# Patient Record
Sex: FEMALE | Race: BLACK OR AFRICAN AMERICAN | NOT HISPANIC OR LATINO
[De-identification: names, ages, dates, MRNs, and addresses within clinical notes are randomized per-mention and may not be internally consistent; named-entity substitution may affect disease eponyms.]

---

## 2017-04-09 ENCOUNTER — RESULT REVIEW (OUTPATIENT)
Age: 52
End: 2017-04-09

## 2018-03-27 ENCOUNTER — APPOINTMENT (OUTPATIENT)
Dept: ULTRASOUND IMAGING | Facility: IMAGING CENTER | Age: 53
End: 2018-03-27
Payer: COMMERCIAL

## 2018-03-27 ENCOUNTER — OUTPATIENT (OUTPATIENT)
Dept: OUTPATIENT SERVICES | Facility: HOSPITAL | Age: 53
LOS: 1 days | End: 2018-03-27
Payer: COMMERCIAL

## 2018-03-27 DIAGNOSIS — Z00.8 ENCOUNTER FOR OTHER GENERAL EXAMINATION: ICD-10-CM

## 2018-03-27 PROCEDURE — 93971 EXTREMITY STUDY: CPT | Mod: 26,LT

## 2018-03-27 PROCEDURE — 93971 EXTREMITY STUDY: CPT

## 2018-05-30 ENCOUNTER — APPOINTMENT (OUTPATIENT)
Dept: NEUROLOGY | Facility: CLINIC | Age: 53
End: 2018-05-30

## 2019-02-01 ENCOUNTER — EMERGENCY (EMERGENCY)
Facility: HOSPITAL | Age: 54
LOS: 1 days | Discharge: HOME | End: 2019-02-01
Attending: EMERGENCY MEDICINE
Payer: COMMERCIAL

## 2019-02-01 VITALS
WEIGHT: 210.1 LBS | RESPIRATION RATE: 16 BRPM | OXYGEN SATURATION: 97 % | HEIGHT: 65 IN | DIASTOLIC BLOOD PRESSURE: 73 MMHG | HEART RATE: 79 BPM | SYSTOLIC BLOOD PRESSURE: 123 MMHG | TEMPERATURE: 98 F

## 2019-02-01 DIAGNOSIS — Z90.710 ACQUIRED ABSENCE OF BOTH CERVIX AND UTERUS: Chronic | ICD-10-CM

## 2019-02-01 PROCEDURE — 99282 EMERGENCY DEPT VISIT SF MDM: CPT

## 2019-02-01 PROCEDURE — 99283 EMERGENCY DEPT VISIT LOW MDM: CPT

## 2019-02-01 RX ORDER — ACETAMINOPHEN 500 MG
975 TABLET ORAL ONCE
Qty: 0 | Refills: 0 | Status: COMPLETED | OUTPATIENT
Start: 2019-02-01 | End: 2019-02-01

## 2019-02-01 RX ADMIN — Medication 975 MILLIGRAM(S): at 12:58

## 2019-02-01 RX ADMIN — Medication 975 MILLIGRAM(S): at 13:30
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
## 2019-02-01 NOTE — ED ADULT NURSE NOTE - NSIMPLEMENTINTERV_GEN_ALL_ED
Implemented All Fall with Harm Risk Interventions:  [place] to call system. Call bell, personal items and telephone within reach. Instruct patient to call for assistance. Room bathroom lighting operational. Non-slip footwear when patient is off stretcher. Physically safe environment: no spills, clutter or unnecessary equipment. Stretcher in lowest position, wheels locked, appropriate side rails in place. Provide visual cue, wrist band, yellow gown, etc. Monitor gait and stability. Monitor for mental status changes and reorient to person, place, and time. Review medications for side effects contributing to fall risk. Reinforce activity limits and safety measures with patient and family. Provide visual clues: red socks.

## 2019-02-01 NOTE — ED PROVIDER NOTE - NS ED ROS FT
Gen: No fever, normal appetite  Eyes: No eye irritation or discharge  ENT: No ear pain, congestion, sore throat  Resp: No cough or trouble breathing  Cardiovascular: No chest pain or palpitation  Gastroenteric: No nausea/vomiting, diarrhea, constipation  :  No change in urine output; no dysuria  MS: + back pain, neck pain   Skin: No rashes  Neuro: No headache; no abnormal movements  Remainder negative, except as per the HPI Gen: No fever, normal appetite  Eyes: No eye irritation or discharge  ENT: No ear pain, congestion, sore throat  Resp: No cough or trouble breathing  Cardiovascular: No chest pain or palpitation  Gastroenteric: No nausea/vomiting, diarrhea, constipation  :  No change in urine output; no dysuria  MS: + back pain, neck pain   Skin: No rashes  Neuro: +  mild  headache; no abnormal movements  Remainder negative, except as per the HPI

## 2019-02-01 NOTE — ED PROVIDER NOTE - PHYSICAL EXAMINATION
Gen: AAO3, NAD   Head: NCAT  ENT: Airway patent, moist mucous membranes  Cardiac: Normal rate, normal rhythm, no murmurs/rubs/gallops appreciated  Respiratory: Lungs CTA B/L  Gastrointestinal: Abdomen soft, nontender, nondistended, no rebound, no guarding  MSK: No gross abnormalities, FROM of all four extremities, no edema  HEME: Extremities warm, pulses intact and symmetrical in all four extremities  Skin: No rashes, no lesions  Neuro: No gross neurologic deficits Gen: AAO3, NAD   Head: NCAT  ENT: Airway patent, moist mucous membranes, TMs clear b/l   Cardiac: Normal rate, normal rhythm, no murmurs/rubs/gallops appreciated  Respiratory: Lungs CTA B/L  Gastrointestinal: Abdomen soft, nontender, nondistended, no rebound, no guarding  MSK: No gross abnormalities, FROM of all four extremities, no edema  HEME: Extremities warm, pulses intact and symmetrical in all four extremities  Skin: No rashes, no lesions  Neuro: No gross neurologic deficits, moves all extremities, strength equal in all four extremities, gait normal, CN II- XII intact Gen: AAO3, NAD   Head: NCAT  ENT: Airway patent, moist mucous membranes, TMs clear b/l , EOMI, no nystagmus   Cardiac: Normal rate, normal rhythm, no murmurs/rubs/gallops appreciated  Respiratory: Lungs CTA B/L  Gastrointestinal: Abdomen soft, nontender, nondistended, no rebound, no guarding  MSK: No gross abnormalities, FROM of all four extremities, no edema  HEME: Extremities warm, pulses intact and symmetrical in all four extremities  Skin: No rashes, no lesions, no ecchymosis   Neuro: No gross neurologic deficits, moves all extremities, no dysmetria, strength equal in all four extremities, gait normal, CN II- XII intact
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
## 2019-02-01 NOTE — ED PROVIDER NOTE - PROGRESS NOTE DETAILS
Yao PGY2: pain improved, pt ambulatory, moves without any restrictions due to pain, feels comfortable with outpt f/u, given return precautions.
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
## 2021-09-20 ENCOUNTER — EMERGENCY (EMERGENCY)
Facility: HOSPITAL | Age: 56
LOS: 1 days | Discharge: HOME | End: 2021-09-20
Attending: STUDENT IN AN ORGANIZED HEALTH CARE EDUCATION/TRAINING PROGRAM
Payer: COMMERCIAL

## 2021-09-20 VITALS
SYSTOLIC BLOOD PRESSURE: 152 MMHG | TEMPERATURE: 98 F | OXYGEN SATURATION: 100 % | RESPIRATION RATE: 17 BRPM | WEIGHT: 253.09 LBS | HEART RATE: 84 BPM | DIASTOLIC BLOOD PRESSURE: 68 MMHG | HEIGHT: 65 IN

## 2021-09-20 VITALS
TEMPERATURE: 98 F | OXYGEN SATURATION: 96 % | RESPIRATION RATE: 16 BRPM | SYSTOLIC BLOOD PRESSURE: 124 MMHG | HEART RATE: 83 BPM | DIASTOLIC BLOOD PRESSURE: 71 MMHG

## 2021-09-20 DIAGNOSIS — Z90.710 ACQUIRED ABSENCE OF BOTH CERVIX AND UTERUS: Chronic | ICD-10-CM

## 2021-09-20 PROBLEM — I82.409 ACUTE EMBOLISM AND THROMBOSIS OF UNSPECIFIED DEEP VEINS OF UNSPECIFIED LOWER EXTREMITY: Chronic | Status: ACTIVE | Noted: 2019-02-01

## 2021-09-20 PROBLEM — I10 ESSENTIAL (PRIMARY) HYPERTENSION: Chronic | Status: ACTIVE | Noted: 2019-02-01

## 2021-09-20 LAB
APPEARANCE UR: ABNORMAL
APTT BLD: 52.4 SEC — HIGH (ref 27.5–35.5)
BACTERIA # UR AUTO: NEGATIVE — SIGNIFICANT CHANGE UP
BASOPHILS # BLD AUTO: 0.03 K/UL — SIGNIFICANT CHANGE UP (ref 0–0.2)
BASOPHILS NFR BLD AUTO: 0.5 % — SIGNIFICANT CHANGE UP (ref 0–2)
BILIRUB UR-MCNC: NEGATIVE — SIGNIFICANT CHANGE UP
COD CRY URNS QL: ABNORMAL
COLOR SPEC: ABNORMAL
DIFF PNL FLD: ABNORMAL
EOSINOPHIL # BLD AUTO: 0.19 K/UL — SIGNIFICANT CHANGE UP (ref 0–0.5)
EOSINOPHIL NFR BLD AUTO: 2.9 % — SIGNIFICANT CHANGE UP (ref 0–6)
EPI CELLS # UR: 3 /HPF — SIGNIFICANT CHANGE UP
GLUCOSE UR QL: NEGATIVE — SIGNIFICANT CHANGE UP
HCT VFR BLD CALC: 44.2 % — SIGNIFICANT CHANGE UP (ref 34.5–45)
HGB BLD-MCNC: 14 G/DL — SIGNIFICANT CHANGE UP (ref 11.5–15.5)
HYALINE CASTS # UR AUTO: 1 /LPF — SIGNIFICANT CHANGE UP (ref 0–2)
IMM GRANULOCYTES NFR BLD AUTO: 0.3 % — SIGNIFICANT CHANGE UP (ref 0–1.5)
INR BLD: 3.01 RATIO — HIGH (ref 0.88–1.16)
KETONES UR-MCNC: NEGATIVE — SIGNIFICANT CHANGE UP
LEUKOCYTE ESTERASE UR-ACNC: NEGATIVE — SIGNIFICANT CHANGE UP
LYMPHOCYTES # BLD AUTO: 2.03 K/UL — SIGNIFICANT CHANGE UP (ref 1–3.3)
LYMPHOCYTES # BLD AUTO: 30.5 % — SIGNIFICANT CHANGE UP (ref 13–44)
MCHC RBC-ENTMCNC: 27.4 PG — SIGNIFICANT CHANGE UP (ref 27–34)
MCHC RBC-ENTMCNC: 31.7 GM/DL — LOW (ref 32–36)
MCV RBC AUTO: 86.5 FL — SIGNIFICANT CHANGE UP (ref 80–100)
MONOCYTES # BLD AUTO: 0.65 K/UL — SIGNIFICANT CHANGE UP (ref 0–0.9)
MONOCYTES NFR BLD AUTO: 9.8 % — SIGNIFICANT CHANGE UP (ref 2–14)
NEUTROPHILS # BLD AUTO: 3.73 K/UL — SIGNIFICANT CHANGE UP (ref 1.8–7.4)
NEUTROPHILS NFR BLD AUTO: 56 % — SIGNIFICANT CHANGE UP (ref 43–77)
NITRITE UR-MCNC: NEGATIVE — SIGNIFICANT CHANGE UP
NRBC # BLD: 0 /100 WBCS — SIGNIFICANT CHANGE UP (ref 0–0)
PH UR: 6 — SIGNIFICANT CHANGE UP (ref 5–8)
PLATELET # BLD AUTO: 273 K/UL — SIGNIFICANT CHANGE UP (ref 150–400)
PROT UR-MCNC: 100 — SIGNIFICANT CHANGE UP
PROTHROM AB SERPL-ACNC: 34.3 SEC — HIGH (ref 10.6–13.6)
RBC # BLD: 5.11 M/UL — SIGNIFICANT CHANGE UP (ref 3.8–5.2)
RBC # FLD: 15.2 % — HIGH (ref 10.3–14.5)
RBC CASTS # UR COMP ASSIST: 768 /HPF — HIGH (ref 0–4)
SP GR SPEC: 1.02 — SIGNIFICANT CHANGE UP (ref 1.01–1.02)
UROBILINOGEN FLD QL: NEGATIVE — SIGNIFICANT CHANGE UP
WBC # BLD: 6.65 K/UL — SIGNIFICANT CHANGE UP (ref 3.8–10.5)
WBC # FLD AUTO: 6.65 K/UL — SIGNIFICANT CHANGE UP (ref 3.8–10.5)
WBC UR QL: 6 /HPF — HIGH (ref 0–5)

## 2021-09-20 PROCEDURE — 99284 EMERGENCY DEPT VISIT MOD MDM: CPT

## 2021-09-20 PROCEDURE — 87086 URINE CULTURE/COLONY COUNT: CPT

## 2021-09-20 PROCEDURE — 85730 THROMBOPLASTIN TIME PARTIAL: CPT

## 2021-09-20 PROCEDURE — 85025 COMPLETE CBC W/AUTO DIFF WBC: CPT

## 2021-09-20 PROCEDURE — 85610 PROTHROMBIN TIME: CPT

## 2021-09-20 PROCEDURE — 81001 URINALYSIS AUTO W/SCOPE: CPT

## 2021-09-20 PROCEDURE — 99283 EMERGENCY DEPT VISIT LOW MDM: CPT

## 2021-09-20 NOTE — ED PROVIDER NOTE - CARE PROVIDER_API CALL
Jerardo Ramires)  Urology  [address] [place] [address], [place] [address]  Phone: [phone]  Fax: [phone]  Follow Up Time:

## 2021-09-20 NOTE — ED PROVIDER NOTE - NSFOLLOWUPINSTRUCTIONS_ED_ALL_ED_FT
Hematuria on AC r/o UTI, stones, supratherapeutic INR  Labs- cbc, coags  UA, UCx  Reassess Someone will call you for an urgent follow up from the hospital to schedule your Urology appointment. Please continue to take all of your medication as prescribed.  Return to the ER if you experience worsening symptoms or new concerning symptoms including but not limited to clots in the urine, difficulty urinating, inability to urinate, dizziness, fever, chills.  Your INR today was 3.0

## 2021-09-20 NOTE — ED PROVIDER NOTE - ATTENDING CONTRIBUTION TO CARE
I performed a history and physical exam of the patient and discussed their management with the PA/NP.  I reviewed the ACP's note and agree with the documented findings and plan of care except as noted below. My medical decision making and observations are as follows:    56 yo F with a PMH of DVT 2007 on Coumadin (12mg daily except on Mondays 7mg), HTN p/w hematuria x 4 days. States urine is blood tinged, no clots, no associated pain.  No fevers/chills.  Pt had recent INR check of 3.0.  Pt well appearing in NAD, abd soft ntnd, no CVA ttp, normal gait.  Possible UTI, less likely stone given no pain, possible mass, possible elevated INR - plan for labs and likely urology out patient follow up if labs WNL.

## 2021-09-20 NOTE — ED PROVIDER NOTE - PATIENT PORTAL LINK FT
You can access the FollowMyHealth Patient Portal offered by [facility] by registering at the following website: [email or web address]. By joining [email or web address] FollowMyHealth portal, you will also be able to view your health information using other applications (apps) compatible with our system.

## 2021-09-20 NOTE — ED ADULT NURSE NOTE - OBJECTIVE STATEMENT
56 y/o female c/o blood in urine and frequent urination x 4 days ago.  Pt reports no back pain, no burning with urination, no lower abd pain, no n/v/d, no fever, no chills.  Respiration easy and unlabored.  All extremities mobile.  No acute respiratory distress noted. 56 y/o female c/o blood tinged  urine  x 4 days ago.  Pt reports no back pain, no burning with urination, no dysuria, no urinary frequency,  no lower abd pain, no n/v/d, no fever, no chills.  Respiration easy and unlabored.  All extremities mobile.  No acute respiratory distress noted.   Denies nausea, vomiting, fever, chills, chest pain, SOB, cough, abd pain, flank pain, headache, dizziness. 54 y/o female c/o blood tinged  urine  x 4 days ago.  Pt reports no back pain, no burning with urination, no dysuria, no urinary frequency,  no abd pain, no n/v/d, no fever, no chills, no cough, no SOB, no chest pain, no flank pain, no headache, no dizziness.  Respiration easy and unlabored.  All extremities mobile.  No acute respiratory distress noted.

## 2021-09-20 NOTE — ED PROVIDER NOTE - PHYSICAL EXAMINATION
GEN: Pt in NAD, well appearing. A&O x3. GCS 15  EYES: Sclera white w/o injection, PERRLA, EOMI.  ENT: Head NCAT. Nose without deformity. Mouth and pharynx without erythema or exudates, uvula midline. Moist mucous membranes. Neck supple FROM.   RESP: Lungs CTA b/l, no wheezes, rales, or rhonchi. Speaking in full sentences.  CARDIAC: RRR, clear distinct S1, S2, no murmurs, gallops, or rubs.   ABD: Abdomen non-distended, soft, non-tender, no rebound or guarding. No CVAT b/l.  NEURO: CN 2-12 grossly intact. Speech clear.  MSK: No joint erythema or obvious deformities. Spine without obvious deformity. No midline or paraspinal tenderness. FROM w/o pain of upper and lower extremities b/l.  SKIN: No rashes noted.

## 2021-09-20 NOTE — ED ADULT NURSE NOTE - NSFALLRSKINDICATORS_ED_ALL_ED
Wife states pt. has vomited multiple times since last night--  States >8 times large amounts.  He is not retaining food or fluids today.  Temp has not been checked and he has chills.  Wife states pt. feels weak and lightheaded today.  Advised ER eval today due to inability to eat, drink,   Lightheadedness, chills .  Also states moderate headache today/body aches  .  Wife is in agreement and will take pt. to the ER as advised.    Patient notified and verbalized understanding of providers response. 3-way repeat back was completed on the information provided by the provider for verification and accuracy. Patient was in agreement and denied further questions or concerns.  To clinical for call back post ER visit.     no

## 2021-09-20 NOTE — ED PROVIDER NOTE - OBJECTIVE STATEMENT
56 yo F with a PMH of DVT on Coumadin (12mg daily except on Mondays 7mg), HTN p/w hematuria x 4 days. States urine is blood tinged, no clots. No associated pain. Denies urinary complaints. Voiding w/o difficulty. Had UTI many yrs ago, was dx by dysuria and frequency which she denies today. No hx of kidney stones. Compliant with AC, did not take any extra doses by accident. No ASA use. Denies nausea, vomiting, fever, chills, chest pain, SOB, cough, abd pain, flank pain, headache, dizziness. No black stool or blood in stool, hemoptysis, hematemesis, excessive bruising or bleeding. Last INR was on Thursday and was told it was 3.0 54 yo F with a PMH of DVT 2007 on Coumadin (12mg daily except on Mondays 7mg), HTN p/w hematuria x 4 days. States urine is blood tinged, no clots. No associated pain. Denies dysuria, burning with urination, urinary freq/urgency. Voiding w/o difficulty. Had UTI many yrs ago, at that time pt presented with dysuria and frequency which she denies today. No hx of kidney stones. Compliant with AC, did not take any extra doses by accident. No ASA use. Denies nausea, vomiting, fever, chills, chest pain, SOB, cough, abd pain, flank pain, headache, dizziness. No black stool or blood in stool, hemoptysis, hematemesis, excessive bruising or bleeding. Last INR was on Thursday and was told it was 3.0

## 2021-09-20 NOTE — ED PROVIDER NOTE - PROGRESS NOTE DETAILS
Urgent f/u with Urology recommended. Referral placed   Advised to continue home meds as prescribed  To f/u with PMD as well  Copy of results provided  Strict return to ED instructions dw pt, verbalized understanding  Enrique Gamez PA-C

## 2021-09-21 LAB
CULTURE RESULTS: SIGNIFICANT CHANGE UP
SPECIMEN SOURCE: SIGNIFICANT CHANGE UP

## 2021-10-11 ENCOUNTER — APPOINTMENT (OUTPATIENT)
Dept: UROLOGY | Facility: CLINIC | Age: 56
End: 2021-10-11

## 2022-05-29 ENCOUNTER — INPATIENT (INPATIENT)
Facility: HOSPITAL | Age: 57
LOS: 2 days | Discharge: HOME | DRG: 202 | End: 2022-06-01
Attending: INTERNAL MEDICINE | Admitting: STUDENT IN AN ORGANIZED HEALTH CARE EDUCATION/TRAINING PROGRAM
Payer: COMMERCIAL

## 2022-05-29 VITALS
HEART RATE: 91 BPM | HEIGHT: 65 IN | WEIGHT: 250 LBS | DIASTOLIC BLOOD PRESSURE: 79 MMHG | RESPIRATION RATE: 18 BRPM | SYSTOLIC BLOOD PRESSURE: 113 MMHG | TEMPERATURE: 98 F | OXYGEN SATURATION: 95 %

## 2022-05-29 DIAGNOSIS — Z90.710 ACQUIRED ABSENCE OF BOTH CERVIX AND UTERUS: Chronic | ICD-10-CM

## 2022-05-29 PROCEDURE — 99285 EMERGENCY DEPT VISIT HI MDM: CPT

## 2022-05-29 PROCEDURE — 93010 ELECTROCARDIOGRAM REPORT: CPT

## 2022-05-29 RX ORDER — IPRATROPIUM/ALBUTEROL SULFATE 18-103MCG
3 AEROSOL WITH ADAPTER (GRAM) INHALATION ONCE
Refills: 0 | Status: COMPLETED | OUTPATIENT
Start: 2022-05-29 | End: 2022-05-29

## 2022-05-30 DIAGNOSIS — R19.7 DIARRHEA, UNSPECIFIED: ICD-10-CM

## 2022-05-30 DIAGNOSIS — I10 ESSENTIAL (PRIMARY) HYPERTENSION: ICD-10-CM

## 2022-05-30 DIAGNOSIS — J45.901 UNSPECIFIED ASTHMA WITH (ACUTE) EXACERBATION: ICD-10-CM

## 2022-05-30 DIAGNOSIS — I82.409 ACUTE EMBOLISM AND THROMBOSIS OF UNSPECIFIED DEEP VEINS OF UNSPECIFIED LOWER EXTREMITY: ICD-10-CM

## 2022-05-30 DIAGNOSIS — Z29.9 ENCOUNTER FOR PROPHYLACTIC MEASURES, UNSPECIFIED: ICD-10-CM

## 2022-05-30 DIAGNOSIS — M25.561 PAIN IN RIGHT KNEE: ICD-10-CM

## 2022-05-30 LAB
ALBUMIN SERPL ELPH-MCNC: 4 G/DL — SIGNIFICANT CHANGE UP (ref 3.3–5)
ALBUMIN SERPL ELPH-MCNC: 4.4 G/DL — SIGNIFICANT CHANGE UP (ref 3.3–5)
ALP SERPL-CCNC: 64 U/L — SIGNIFICANT CHANGE UP (ref 40–120)
ALP SERPL-CCNC: 72 U/L — SIGNIFICANT CHANGE UP (ref 40–120)
ALT FLD-CCNC: 23 U/L — SIGNIFICANT CHANGE UP (ref 10–45)
ALT FLD-CCNC: 24 U/L — SIGNIFICANT CHANGE UP (ref 10–45)
ANION GAP SERPL CALC-SCNC: 13 MMOL/L — SIGNIFICANT CHANGE UP (ref 5–17)
ANION GAP SERPL CALC-SCNC: 14 MMOL/L — SIGNIFICANT CHANGE UP (ref 5–17)
APTT BLD: 32 SEC — SIGNIFICANT CHANGE UP (ref 27.5–35.5)
APTT BLD: 40.5 SEC — HIGH (ref 27.5–35.5)
AST SERPL-CCNC: 25 U/L — SIGNIFICANT CHANGE UP (ref 10–40)
AST SERPL-CCNC: 28 U/L — SIGNIFICANT CHANGE UP (ref 10–40)
BASE EXCESS BLDV CALC-SCNC: -0.5 MMOL/L — SIGNIFICANT CHANGE UP (ref -2–2)
BASOPHILS # BLD AUTO: 0.04 K/UL — SIGNIFICANT CHANGE UP (ref 0–0.2)
BASOPHILS NFR BLD AUTO: 0.5 % — SIGNIFICANT CHANGE UP (ref 0–2)
BILIRUB SERPL-MCNC: 0.3 MG/DL — SIGNIFICANT CHANGE UP (ref 0.2–1.2)
BILIRUB SERPL-MCNC: 0.4 MG/DL — SIGNIFICANT CHANGE UP (ref 0.2–1.2)
BUN SERPL-MCNC: 14 MG/DL — SIGNIFICANT CHANGE UP (ref 7–23)
BUN SERPL-MCNC: 17 MG/DL — SIGNIFICANT CHANGE UP (ref 7–23)
CA-I SERPL-SCNC: 1.26 MMOL/L — SIGNIFICANT CHANGE UP (ref 1.15–1.33)
CALCIUM SERPL-MCNC: 9.5 MG/DL — SIGNIFICANT CHANGE UP (ref 8.4–10.5)
CALCIUM SERPL-MCNC: 9.9 MG/DL — SIGNIFICANT CHANGE UP (ref 8.4–10.5)
CHLORIDE BLDV-SCNC: 106 MMOL/L — SIGNIFICANT CHANGE UP (ref 96–108)
CHLORIDE SERPL-SCNC: 104 MMOL/L — SIGNIFICANT CHANGE UP (ref 96–108)
CHLORIDE SERPL-SCNC: 107 MMOL/L — SIGNIFICANT CHANGE UP (ref 96–108)
CO2 BLDV-SCNC: 26 MMOL/L — SIGNIFICANT CHANGE UP (ref 22–26)
CO2 SERPL-SCNC: 21 MMOL/L — LOW (ref 22–31)
CO2 SERPL-SCNC: 22 MMOL/L — SIGNIFICANT CHANGE UP (ref 22–31)
CREAT SERPL-MCNC: 0.74 MG/DL — SIGNIFICANT CHANGE UP (ref 0.5–1.3)
CREAT SERPL-MCNC: 0.88 MG/DL — SIGNIFICANT CHANGE UP (ref 0.5–1.3)
D DIMER BLD IA.RAPID-MCNC: <150 NG/ML DDU — SIGNIFICANT CHANGE UP
EGFR: 77 ML/MIN/1.73M2 — SIGNIFICANT CHANGE UP
EGFR: 94 ML/MIN/1.73M2 — SIGNIFICANT CHANGE UP
EOSINOPHIL # BLD AUTO: 0.26 K/UL — SIGNIFICANT CHANGE UP (ref 0–0.5)
EOSINOPHIL NFR BLD AUTO: 3.4 % — SIGNIFICANT CHANGE UP (ref 0–6)
GAS PNL BLDV: 138 MMOL/L — SIGNIFICANT CHANGE UP (ref 136–145)
GAS PNL BLDV: SIGNIFICANT CHANGE UP
GAS PNL BLDV: SIGNIFICANT CHANGE UP
GLUCOSE BLDV-MCNC: 217 MG/DL — HIGH (ref 70–99)
GLUCOSE SERPL-MCNC: 113 MG/DL — HIGH (ref 70–99)
GLUCOSE SERPL-MCNC: 216 MG/DL — HIGH (ref 70–99)
HCO3 BLDV-SCNC: 25 MMOL/L — SIGNIFICANT CHANGE UP (ref 22–29)
HCT VFR BLD CALC: 41.3 % — SIGNIFICANT CHANGE UP (ref 34.5–45)
HCT VFR BLD CALC: 43.3 % — SIGNIFICANT CHANGE UP (ref 34.5–45)
HCT VFR BLDA CALC: 43 % — SIGNIFICANT CHANGE UP (ref 34.5–46.5)
HGB BLD CALC-MCNC: 14.3 G/DL — SIGNIFICANT CHANGE UP (ref 11.7–16.1)
HGB BLD-MCNC: 13.3 G/DL — SIGNIFICANT CHANGE UP (ref 11.5–15.5)
HGB BLD-MCNC: 13.7 G/DL — SIGNIFICANT CHANGE UP (ref 11.5–15.5)
IMM GRANULOCYTES NFR BLD AUTO: 0.3 % — SIGNIFICANT CHANGE UP (ref 0–1.5)
INR BLD: 1.92 RATIO — HIGH (ref 0.88–1.16)
INR BLD: 2.04 RATIO — HIGH (ref 0.88–1.16)
LACTATE BLDV-MCNC: 2.3 MMOL/L — HIGH (ref 0.7–2)
LACTATE BLDV-MCNC: 2.3 MMOL/L — HIGH (ref 0.7–2)
LYMPHOCYTES # BLD AUTO: 2.24 K/UL — SIGNIFICANT CHANGE UP (ref 1–3.3)
LYMPHOCYTES # BLD AUTO: 29.7 % — SIGNIFICANT CHANGE UP (ref 13–44)
MAGNESIUM SERPL-MCNC: 2.3 MG/DL — SIGNIFICANT CHANGE UP (ref 1.6–2.6)
MCHC RBC-ENTMCNC: 26.9 PG — LOW (ref 27–34)
MCHC RBC-ENTMCNC: 27.3 PG — SIGNIFICANT CHANGE UP (ref 27–34)
MCHC RBC-ENTMCNC: 31.6 GM/DL — LOW (ref 32–36)
MCHC RBC-ENTMCNC: 32.2 GM/DL — SIGNIFICANT CHANGE UP (ref 32–36)
MCV RBC AUTO: 84.6 FL — SIGNIFICANT CHANGE UP (ref 80–100)
MCV RBC AUTO: 84.9 FL — SIGNIFICANT CHANGE UP (ref 80–100)
MONOCYTES # BLD AUTO: 0.68 K/UL — SIGNIFICANT CHANGE UP (ref 0–0.9)
MONOCYTES NFR BLD AUTO: 9 % — SIGNIFICANT CHANGE UP (ref 2–14)
NEUTROPHILS # BLD AUTO: 4.31 K/UL — SIGNIFICANT CHANGE UP (ref 1.8–7.4)
NEUTROPHILS NFR BLD AUTO: 57.1 % — SIGNIFICANT CHANGE UP (ref 43–77)
NRBC # BLD: 0 /100 WBCS — SIGNIFICANT CHANGE UP (ref 0–0)
NRBC # BLD: 0 /100 WBCS — SIGNIFICANT CHANGE UP (ref 0–0)
NT-PROBNP SERPL-SCNC: 29 PG/ML — SIGNIFICANT CHANGE UP (ref 0–300)
PCO2 BLDV: 42 MMHG — SIGNIFICANT CHANGE UP (ref 39–42)
PH BLDV: 7.38 — SIGNIFICANT CHANGE UP (ref 7.32–7.43)
PHOSPHATE SERPL-MCNC: 2.3 MG/DL — LOW (ref 2.5–4.5)
PLATELET # BLD AUTO: 212 K/UL — SIGNIFICANT CHANGE UP (ref 150–400)
PLATELET # BLD AUTO: 238 K/UL — SIGNIFICANT CHANGE UP (ref 150–400)
PO2 BLDV: 55 MMHG — HIGH (ref 25–45)
POTASSIUM BLDV-SCNC: 4.1 MMOL/L — SIGNIFICANT CHANGE UP (ref 3.5–5.1)
POTASSIUM SERPL-MCNC: 3.8 MMOL/L — SIGNIFICANT CHANGE UP (ref 3.5–5.3)
POTASSIUM SERPL-MCNC: 4 MMOL/L — SIGNIFICANT CHANGE UP (ref 3.5–5.3)
POTASSIUM SERPL-SCNC: 3.8 MMOL/L — SIGNIFICANT CHANGE UP (ref 3.5–5.3)
POTASSIUM SERPL-SCNC: 4 MMOL/L — SIGNIFICANT CHANGE UP (ref 3.5–5.3)
PROCALCITONIN SERPL-MCNC: 0.08 NG/ML — SIGNIFICANT CHANGE UP (ref 0.02–0.1)
PROT SERPL-MCNC: 8.3 G/DL — SIGNIFICANT CHANGE UP (ref 6–8.3)
PROT SERPL-MCNC: 8.6 G/DL — HIGH (ref 6–8.3)
PROTHROM AB SERPL-ACNC: 22.4 SEC — HIGH (ref 10.5–13.4)
PROTHROM AB SERPL-ACNC: 23.6 SEC — HIGH (ref 10.5–13.4)
RAPID RVP RESULT: SIGNIFICANT CHANGE UP
RBC # BLD: 4.88 M/UL — SIGNIFICANT CHANGE UP (ref 3.8–5.2)
RBC # BLD: 5.1 M/UL — SIGNIFICANT CHANGE UP (ref 3.8–5.2)
RBC # FLD: 14.9 % — HIGH (ref 10.3–14.5)
RBC # FLD: 15 % — HIGH (ref 10.3–14.5)
SAO2 % BLDV: 88.4 % — HIGH (ref 67–88)
SARS-COV-2 RNA SPEC QL NAA+PROBE: SIGNIFICANT CHANGE UP
SARS-COV-2 RNA SPEC QL NAA+PROBE: SIGNIFICANT CHANGE UP
SODIUM SERPL-SCNC: 139 MMOL/L — SIGNIFICANT CHANGE UP (ref 135–145)
SODIUM SERPL-SCNC: 142 MMOL/L — SIGNIFICANT CHANGE UP (ref 135–145)
TROPONIN T, HIGH SENSITIVITY RESULT: 8 NG/L — SIGNIFICANT CHANGE UP (ref 0–51)
WBC # BLD: 6.68 K/UL — SIGNIFICANT CHANGE UP (ref 3.8–10.5)
WBC # BLD: 7.55 K/UL — SIGNIFICANT CHANGE UP (ref 3.8–10.5)
WBC # FLD AUTO: 6.68 K/UL — SIGNIFICANT CHANGE UP (ref 3.8–10.5)
WBC # FLD AUTO: 7.55 K/UL — SIGNIFICANT CHANGE UP (ref 3.8–10.5)

## 2022-05-30 PROCEDURE — 71046 X-RAY EXAM CHEST 2 VIEWS: CPT | Mod: 26

## 2022-05-30 PROCEDURE — 99223 1ST HOSP IP/OBS HIGH 75: CPT | Mod: GC

## 2022-05-30 RX ORDER — LISINOPRIL 2.5 MG/1
30 TABLET ORAL
Qty: 0 | Refills: 0 | DISCHARGE

## 2022-05-30 RX ORDER — LISINOPRIL 2.5 MG/1
0 TABLET ORAL
Qty: 0 | Refills: 0 | DISCHARGE

## 2022-05-30 RX ORDER — IPRATROPIUM/ALBUTEROL SULFATE 18-103MCG
3 AEROSOL WITH ADAPTER (GRAM) INHALATION EVERY 6 HOURS
Refills: 0 | Status: DISCONTINUED | OUTPATIENT
Start: 2022-05-30 | End: 2022-05-30

## 2022-05-30 RX ORDER — LANOLIN ALCOHOL/MO/W.PET/CERES
3 CREAM (GRAM) TOPICAL AT BEDTIME
Refills: 0 | Status: DISCONTINUED | OUTPATIENT
Start: 2022-05-30 | End: 2022-06-01

## 2022-05-30 RX ORDER — SODIUM,POTASSIUM PHOSPHATES 278-250MG
1 POWDER IN PACKET (EA) ORAL EVERY 6 HOURS
Refills: 0 | Status: COMPLETED | OUTPATIENT
Start: 2022-05-30 | End: 2022-05-30

## 2022-05-30 RX ORDER — IPRATROPIUM/ALBUTEROL SULFATE 18-103MCG
3 AEROSOL WITH ADAPTER (GRAM) INHALATION ONCE
Refills: 0 | Status: COMPLETED | OUTPATIENT
Start: 2022-05-30 | End: 2022-05-30

## 2022-05-30 RX ORDER — LISINOPRIL 2.5 MG/1
30 TABLET ORAL DAILY
Refills: 0 | Status: DISCONTINUED | OUTPATIENT
Start: 2022-05-30 | End: 2022-06-01

## 2022-05-30 RX ORDER — WARFARIN SODIUM 2.5 MG/1
7 TABLET ORAL ONCE
Refills: 0 | Status: COMPLETED | OUTPATIENT
Start: 2022-05-30 | End: 2022-05-30

## 2022-05-30 RX ORDER — DEXAMETHASONE 0.5 MG/5ML
6 ELIXIR ORAL ONCE
Refills: 0 | Status: COMPLETED | OUTPATIENT
Start: 2022-05-30 | End: 2022-05-30

## 2022-05-30 RX ORDER — AMLODIPINE BESYLATE 2.5 MG/1
10 TABLET ORAL DAILY
Refills: 0 | Status: DISCONTINUED | OUTPATIENT
Start: 2022-05-30 | End: 2022-06-01

## 2022-05-30 RX ORDER — WARFARIN SODIUM 2.5 MG/1
10 TABLET ORAL
Qty: 0 | Refills: 0 | DISCHARGE

## 2022-05-30 RX ORDER — ACETAMINOPHEN 500 MG
650 TABLET ORAL EVERY 6 HOURS
Refills: 0 | Status: DISCONTINUED | OUTPATIENT
Start: 2022-05-30 | End: 2022-06-01

## 2022-05-30 RX ORDER — IPRATROPIUM/ALBUTEROL SULFATE 18-103MCG
3 AEROSOL WITH ADAPTER (GRAM) INHALATION EVERY 6 HOURS
Refills: 0 | Status: DISCONTINUED | OUTPATIENT
Start: 2022-05-30 | End: 2022-06-01

## 2022-05-30 RX ORDER — AMLODIPINE BESYLATE 2.5 MG/1
0 TABLET ORAL
Qty: 0 | Refills: 0 | DISCHARGE

## 2022-05-30 RX ORDER — MAGNESIUM SULFATE 500 MG/ML
2 VIAL (ML) INJECTION ONCE
Refills: 0 | Status: COMPLETED | OUTPATIENT
Start: 2022-05-30 | End: 2022-05-30

## 2022-05-30 RX ADMIN — Medication 3 MILLILITER(S): at 03:39

## 2022-05-30 RX ADMIN — WARFARIN SODIUM 7 MILLIGRAM(S): 2.5 TABLET ORAL at 22:08

## 2022-05-30 RX ADMIN — Medication 3 MILLILITER(S): at 00:34

## 2022-05-30 RX ADMIN — Medication 600 MILLIGRAM(S): at 17:35

## 2022-05-30 RX ADMIN — Medication 3 MILLILITER(S): at 13:07

## 2022-05-30 RX ADMIN — Medication 3 MILLILITER(S): at 17:36

## 2022-05-30 RX ADMIN — Medication 150 GRAM(S): at 04:28

## 2022-05-30 RX ADMIN — Medication 3 MILLILITER(S): at 03:37

## 2022-05-30 RX ADMIN — Medication 6 MILLIGRAM(S): at 03:38

## 2022-05-30 RX ADMIN — Medication 1 PACKET(S): at 13:06

## 2022-05-30 RX ADMIN — Medication 100 MILLIGRAM(S): at 13:07

## 2022-05-30 RX ADMIN — Medication 100 MILLIGRAM(S): at 22:08

## 2022-05-30 RX ADMIN — Medication 1 PACKET(S): at 17:34

## 2022-05-30 RX ADMIN — Medication 3 MILLILITER(S): at 10:32

## 2022-05-30 RX ADMIN — Medication 3 MILLILITER(S): at 23:12

## 2022-05-30 NOTE — ED PROVIDER NOTE - PHYSICAL EXAMINATION
GEN - NAD; non-toxic; A+Ox3, speaking full sentences, steady gait   HENT - NC/AT, No visible Ecchymosis, No Abrasions, No Lac/Tears, MMM, no discharge  EYES - EOMI, no conjunctival pallor, no scleral icterus  NECK - Neck supple, No LAD, No Swelling  PULM - Diffuse Biphasic Wheezing B/L,  symmetric breath sounds  CV -  S1 S2, no murmurs 2+ Pulses B/L UE  GI - (-) Madrigal's, (-) Rovsings, (-) McBurneys; NT/ND, soft, no guarding, no rebound, no masses    MSK/EXT- no CVA tenderness; no edema, no gross deformity, warm and well perfused, no calf tenderness/swelling/erythema   SKIN - no rash or bruising  NEUROLOGIC - alert, moving all 4 ext with 5/5 Strength

## 2022-05-30 NOTE — H&P ADULT - NSHPREVIEWOFSYSTEMS_GEN_ALL_CORE
General: Denies dizziness, fatigue  Eyes: Denies blurry vision  ENMT: Denies rhinorrhea  Respiratory: Denies cough, SOB  Cardiovascular: Denies palpitations, CP  Gastrointestinal: Denies abd pain, N/V/D/C, hematochezia, melena  : Denies dysuria, increased freq  Musculoskeletal: Denies edema, joint pain  Endocrine: Denies increased thirst, increased frequency  Allergic/Immunologic: Denies rashes or hives  Neuro: Denies weakness, numbness  Psych: Denies anxiety, depression  All ROS negative unless indicated above General: Denies dizziness, fatigue  Eyes: Denies blurry vision  ENMT: Denies rhinorrhea  Respiratory: + Wheezing, + Dry cough, Denies SOB,   Cardiovascular: Denies palpitations, CP  Gastrointestinal: Denies abd pain, N/V/D/C, hematochezia, melena  : Denies dysuria, increased freq  Musculoskeletal: + R knee pain with swelling   Endocrine: Denies increased thirst, increased frequency  Allergic/Immunologic: Denies rashes or hives  Neuro: Denies weakness, numbness  Psych: Denies anxiety, depression  All ROS negative unless indicated above

## 2022-05-30 NOTE — ED PROVIDER NOTE - CHIEF COMPLAINT
The patient is a 56y Female complaining of  The patient is a 56y Female complaining of chest pain and cough.

## 2022-05-30 NOTE — PHYSICAL THERAPY INITIAL EVALUATION ADULT - PERTINENT HX OF CURRENT PROBLEM, REHAB EVAL
Pt is a 55 y/o female admitted to [facility] on 5/30/22 Hx: HTN, DVT (Warfarin), Asthma (no recent inhaler use since youth) p/w SOB, CP, Non-productive cough for the past 2-3 days. Patient says symptoms started suddenly with SOB and dry-cough with no inciting factor. She never has had an asthma exacerbation. Last bowel movement yesterday, Non-bloody. She has been taking warfarin since 2007, has never gotten w/u for the DVT. none

## 2022-05-30 NOTE — H&P ADULT - PROBLEM SELECTOR PLAN 3
Endorsed one episode of diarrhea nonbloody at home   No repeat event since yesterday   - cont. monitor , if does not improve/worsen will send out GI PCR

## 2022-05-30 NOTE — H&P ADULT - ASSESSMENT
56 female, Hx: HTN, DVT (Warfarin), Asthma (no recent inhaler use since youth) p/w SOB, CP, Non-productive cough for the past 2-3 days. Admitted to medicine for Asthma exacerbation.

## 2022-05-30 NOTE — ED PROVIDER NOTE - CLINICAL SUMMARY MEDICAL DECISION MAKING FREE TEXT BOX
56 female, Hx: HTN, DVT (Coumadin), Asthma (no recent inhaler use since youth) - presents with cc: nonproductive cough, fevers (TMax 102F), and chest pain (pleuritic in presentation, feels like a tight sensation every time she coughs). Exam (diffuse biphasic wheezing, NOT hypoxemic, 2+ Pulses B/L UE and LE, no s/sx of DVT on exam), presentation, and history concerning for Asthma Exacerbation iso Viral URI vs. COVID vs. PNA vs. Atypical ACS - plan: CBC, CMP, EKG, CXR, Trop, COVID, RVP, Mg, Duonebs, Re-eval. VSS, non-toxic. Does not require BiPAP at this time.

## 2022-05-30 NOTE — H&P ADULT - PROBLEM SELECTOR PLAN 4
Takes Amlodipine 10, Lisinopril 30 at home   - Restart home meds   - C/w Amlodipine 10mg   - C/w Lisinopril 30mg

## 2022-05-30 NOTE — ED PROVIDER NOTE - ATTENDING CONTRIBUTION TO CARE
Ronen Loaiza MD:   I personally saw the patient and performed a substantive portion of the visit including all aspects of the medical decision making.    MDM: 56 F who p/w cough for last 3-4 days, associated with chest tightness with the cough, and fever up to 102'F at home. Exam w/ expiratory wheezing bilaterally.   Will evaluate for PNA, viral vs bacterial. Will Tx for bronchospasm which may be related to remote hx of asthma triggered by recent respiratory infection. Pt with CAD Hx, will obtain EKG and trop, but symptoms are atypical for ACS. Pt w/ DVT Hx but presentation more likely infectious, and pts pulm exam more consistent with bronchospasm, will obtain INR and if subtherapeutic, will obtain D-dimer vs CTA. Ronen Loaiza MD:   I personally saw the patient and performed a substantive portion of the visit including all aspects of the medical decision making.    MDM: 56 F who p/w cough for last 3-4 days, associated with chest tightness with the cough, and fever up to 102'F at home. Exam w/ expiratory wheezing bilaterally.   Will evaluate for PNA, viral vs bacterial. Will Tx for bronchospasm which may be related to remote hx of asthma triggered by recent respiratory infection. Pt with CAD Hx, will obtain EKG and trop, but symptoms are atypical for ACS. Pt w/ DVT Hx but presentation more likely infectious, and pts pulm exam more consistent with bronchospasm, will obtain INR and if subtherapeutic, will obtain D-dimer vs CTA.  Signed out at midnight pending labs and reassessment.

## 2022-05-30 NOTE — H&P ADULT - NSHPPHYSICALEXAM_GEN_ALL_CORE
CONSTITUTIONAL: NAD; well-developed; Obese ; Speaking full sentences with occasional dry cough when deep breath  HEENT: PERRL, clear conjunctiva  RESPIRATORY: Normal respiratory effort; + Wheezing R base;, No Crackles/Rhonchi  CARDIOVASCULAR: Regular rate and rhythm, normal S1 and S2, no murmur/rub/gallop; No lower extremity edema; Peripheral pulses are 2+ bilaterally  ABDOMEN: Nontender to Palpation; normoactive bowel sounds, no rebound/guarding; No hepatosplenomegaly  MUSCULOSKELETAL: no clubbing or cyanosis of digits; no joint swelling or tenderness to palpation  EXTREMITY:+ R Lower extremity knee joint TTP; no calf swelling, non-erythematous   NEURO: A&Ox3; no focal deficits   PSYCH: normal mood; Affect appropirate Vital Signs Last 24 Hrs  T(C): 37.2 (30 May 2022 05:10), Max: 37.2 (30 May 2022 05:10)  T(F): 98.9 (30 May 2022 05:10), Max: 98.9 (30 May 2022 05:10)  HR: 91 (30 May 2022 05:10) (85 - 91)  BP: 133/94 (30 May 2022 05:10) (113/79 - 133/94)  BP(mean): 85 (30 May 2022 03:48) (85 - 85)  RR: 18 (30 May 2022 05:10) (18 - 20)  SpO2: 93% (30 May 2022 10:44) (91% - 96%)    CONSTITUTIONAL: NAD; well-developed; Obese ; Speaking full sentences with occasional dry cough when deep breath  HEENT: PERRL, clear conjunctiva  RESPIRATORY: Normal respiratory effort; + Wheezing R base;, No Crackles/Rhonchi  CARDIOVASCULAR: Regular rate and rhythm, normal S1 and S2, no murmur/rub/gallop; No lower extremity edema; Peripheral pulses are 2+ bilaterally  ABDOMEN: Nontender to Palpation; normoactive bowel sounds, no rebound/guarding; No hepatosplenomegaly  MUSCULOSKELETAL: no clubbing or cyanosis of digits; no joint swelling or tenderness to palpation  EXTREMITY:+ R Lower extremity knee joint TTP; no calf swelling, non-erythematous   NEURO: A&Ox3; no focal deficits   PSYCH: normal mood; Affect appropirate

## 2022-05-30 NOTE — H&P ADULT - NSHPLABSRESULTS_GEN_ALL_CORE
LABS:  cret                        13.3   7.55  )-----------( 212      ( 30 May 2022 00:52 )             41.3     05-30    142  |  107  |  17  ----------------------------<  113<H>  3.8   |  22  |  0.88    Ca    9.5      30 May 2022 00:52    TPro  8.3  /  Alb  4.0  /  TBili  0.3  /  DBili  x   /  AST  25  /  ALT  23  /  AlkPhos  72  05-30    PT/INR - ( 30 May 2022 00:52 )   PT: 22.4 sec;   INR: 1.92 ratio         PTT - ( 30 May 2022 00:52 )  PTT:32.0 sec        Radiology: Reviewed   EKG: Reviewed

## 2022-05-30 NOTE — ED ADULT NURSE NOTE - OBJECTIVE STATEMENT
57 y/o female with PMH of Asthma, HTN, DVT on Coumadin arrives to the ER complaining of chest pain. Pt is A&Ox4, speaking coherently, states having non productive cough chest pain on inspiration or when coughing with a associated fever at home (102)  at arrival pt is afebrile, SPO2 95% RA, EKG performed.   On assessment airway is patent, breathing spontaneously and unlabored, speaking full sentences. Skin is dry, warm and color appropriate to race.  Abdomen is soft, no distended, no tender. Full ROM in all extremities.  Patient undressed and placed into gown, side rails up with bed locked and in lowest position for safety. call bell within reach. [place] provided. Comfort and safety provided. will continue to reassess.

## 2022-05-30 NOTE — ED PROVIDER NOTE - NS ED ROS FT
Constitution: (+) Fever or chills, No Weight Loss,   Eyes: No visual changes  HEENT: (+) cough, No Discharge, No Rhinorrhea, No URI symptoms  Cardio: (+) Chest pain, No Palpitations, (+) Dyspnea  Resp: No SOB, (+) Wheezing  GI: No abdominal pain, No Nausea, No Vomiting, No Constipation, No Diarrhea  : No burning upon urination, trouble urinating, no foul odor from urine  MSK: No Back pain, No Numbness, No Tingling, No Weakness  Neuro: No Headache, No changes to Vision, No changes to Hearing, Normal Gait  Skin: No rashes, No Bruising, No Swelling

## 2022-05-30 NOTE — ED PROVIDER NOTE - OBJECTIVE STATEMENT
56 female, Hx: HTN, DVT (Coumadin), Asthma (no recent inhaler use since youth) - presents with cc: nonproductive cough, fevers (TMax 102F), and chest pain (pleuritic in presentation, feels like a tight sensation every time she coughs). Denies any SOB, abdominal pain, nausea, vomiting, diarrhea, bloody stools, dysuric symptoms. Denies missing any doses of coumadin. Denies any recent surg/hosp, denies any calf tenderness/swelling/erythema.

## 2022-05-30 NOTE — H&P ADULT - PROBLEM SELECTOR PLAN 1
Endorsed SOB, wheezing and dry cough at home.   Did endorse 102F at home , w associated diarrhea   S/P Duonebs and dexa in ED which improved her symptoms greatly    - Standing duonebs q6   - C/w prednisone 40mg x 4 days   - Endorsed SOB, wheezing and dry cough at home. No recent sick contacts   Did endorse 102F at home , w associated diarrhea , possible exacerbation 2/2 Viral URI.  No focal consolidation on CXR , low concern for bacterial PNA   S/P Duonebs and dexa in ED which improved her symptoms greatly    - Standing duonebs q6   - C/w prednisone 40mg x 4 days   - F/U RVP   - Symptomatic Tx for Cough

## 2022-05-30 NOTE — H&P ADULT - PROBLEM SELECTOR PLAN 5
Knee pain for past 2-3 days with some swelling. No calf swelling. Mild TPP on knee joint, does not affect her ambulation. Has been improving   Non-erythematous  Post-viral arthritis?   - Pain control PRN   - Will monitor for infectious signs / symptoms

## 2022-05-30 NOTE — PATIENT PROFILE ADULT - FALL HARM RISK - UNIVERSAL INTERVENTIONS
Bed in lowest position, wheels locked, appropriate side rails in place/Call bell, personal items and telephone in reach/Instruct patient to call for assistance before getting out of bed or chair/Non-slip footwear when patient is out of bed/[place] to call system/Physically safe environment - no spills, clutter or unnecessary equipment/Purposeful Proactive Rounding/Room/bathroom lighting operational, light cord in reach

## 2022-05-30 NOTE — ED PROVIDER NOTE - PROGRESS NOTE DETAILS
Resident Narciso: preliminary eval concerning for SOB iso underlying asthma vs. superimposed viral URI. Pt with improvement in respiratory status s/p  3 B2B but with persistent wheezing - will require further treatment. Case endorsed to MD Tima. CXR without focal infiltrate, Procal low - do not suspect any acute bacterial PNA - will hold off on abx.

## 2022-05-30 NOTE — H&P ADULT - HISTORY OF PRESENT ILLNESS
56 female, Hx: HTN, DVT (Warfarin), Asthma (no recent inhaler use since youth) p/w SOB, CP, Non-productive cough for the past 2-3 days. Patient says symptoms started suddenly with SOB and dry-cough with no inciting factor. She never has had an asthma exacerbation. She says she had 102 fever at home, and asociated diarrhea as well. Last bowel movement yesterday, Non-bloody. She has been taking warfarin since 2007, has never gotten w/u for the DVT. AS per records, had thrmbolysis in 2007 for L subclavian DVT. missed one dose of warfarin yesterday. Does not smoke. Also endorces some R knee swelling, but no calf swelling. Denies CP, Headache, back pain, N/V, ABD pain, constipation, urinary symptoms.     ED: Dexax1, Duonebs.

## 2022-05-30 NOTE — H&P ADULT - PROBLEM SELECTOR PLAN 2
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  Denies pleuretic CP, no calf swelling/erythema   INR subtherapeutic on admission, goal 2-3   - F/U D-dimer   - restart home dose of warfarin

## 2022-05-30 NOTE — PHYSICAL THERAPY INITIAL EVALUATION ADULT - ADDITIONAL COMMENTS
Pt lives in a private house alone with one flight of steps inside. Pt was Ind with all ADLs and amb without AD.

## 2022-05-30 NOTE — H&P ADULT - ATTENDING COMMENTS
Patient presenting with what appears to be an asthma exacerbation secondary to viral infection (or precipitated by allergen?). Has wheezing and coarse breath sounds on exam. Not hypoxic and no respiratory accessory muscle use. Otherwise has well controlled asthma with no exacerbations. Improved with steroids & DuoNebs.    Labs personally reviewed   No leukocytosis  CXR personally reviewed by me: no consolidation    Plan:  Treat for asthma exacerbation with standing SHAYLA & steroids. Can monitor improvement clinically and with peak flow. Send RVP for diagnostic work up.    On warfarin for prior VTE, goal INR 2-3.     Symptom management otherwise.

## 2022-05-31 LAB
ALBUMIN SERPL ELPH-MCNC: 3.7 G/DL — SIGNIFICANT CHANGE UP (ref 3.3–5)
ALP SERPL-CCNC: 62 U/L — SIGNIFICANT CHANGE UP (ref 40–120)
ALT FLD-CCNC: 20 U/L — SIGNIFICANT CHANGE UP (ref 10–45)
ANION GAP SERPL CALC-SCNC: 14 MMOL/L — SIGNIFICANT CHANGE UP (ref 5–17)
APTT BLD: 34.4 SEC — SIGNIFICANT CHANGE UP (ref 27.5–35.5)
AST SERPL-CCNC: 21 U/L — SIGNIFICANT CHANGE UP (ref 10–40)
BASOPHILS # BLD AUTO: 0.02 K/UL — SIGNIFICANT CHANGE UP (ref 0–0.2)
BASOPHILS NFR BLD AUTO: 0.2 % — SIGNIFICANT CHANGE UP (ref 0–2)
BILIRUB SERPL-MCNC: 0.2 MG/DL — SIGNIFICANT CHANGE UP (ref 0.2–1.2)
BUN SERPL-MCNC: 17 MG/DL — SIGNIFICANT CHANGE UP (ref 7–23)
CALCIUM SERPL-MCNC: 9.5 MG/DL — SIGNIFICANT CHANGE UP (ref 8.4–10.5)
CHLORIDE SERPL-SCNC: 106 MMOL/L — SIGNIFICANT CHANGE UP (ref 96–108)
CO2 SERPL-SCNC: 21 MMOL/L — LOW (ref 22–31)
CREAT SERPL-MCNC: 0.79 MG/DL — SIGNIFICANT CHANGE UP (ref 0.5–1.3)
EGFR: 87 ML/MIN/1.73M2 — SIGNIFICANT CHANGE UP
EOSINOPHIL # BLD AUTO: 0.01 K/UL — SIGNIFICANT CHANGE UP (ref 0–0.5)
EOSINOPHIL NFR BLD AUTO: 0.1 % — SIGNIFICANT CHANGE UP (ref 0–6)
GLUCOSE SERPL-MCNC: 108 MG/DL — HIGH (ref 70–99)
HCT VFR BLD CALC: 39.9 % — SIGNIFICANT CHANGE UP (ref 34.5–45)
HCV AB S/CO SERPL IA: 0.1 S/CO — SIGNIFICANT CHANGE UP (ref 0–0.99)
HCV AB SERPL-IMP: SIGNIFICANT CHANGE UP
HGB BLD-MCNC: 12.5 G/DL — SIGNIFICANT CHANGE UP (ref 11.5–15.5)
IMM GRANULOCYTES NFR BLD AUTO: 0.4 % — SIGNIFICANT CHANGE UP (ref 0–1.5)
INR BLD: 1.76 RATIO — HIGH (ref 0.88–1.16)
LYMPHOCYTES # BLD AUTO: 1.7 K/UL — SIGNIFICANT CHANGE UP (ref 1–3.3)
LYMPHOCYTES # BLD AUTO: 20.9 % — SIGNIFICANT CHANGE UP (ref 13–44)
MAGNESIUM SERPL-MCNC: 2.2 MG/DL — SIGNIFICANT CHANGE UP (ref 1.6–2.6)
MCHC RBC-ENTMCNC: 26.9 PG — LOW (ref 27–34)
MCHC RBC-ENTMCNC: 31.3 GM/DL — LOW (ref 32–36)
MCV RBC AUTO: 85.8 FL — SIGNIFICANT CHANGE UP (ref 80–100)
MONOCYTES # BLD AUTO: 0.7 K/UL — SIGNIFICANT CHANGE UP (ref 0–0.9)
MONOCYTES NFR BLD AUTO: 8.6 % — SIGNIFICANT CHANGE UP (ref 2–14)
NEUTROPHILS # BLD AUTO: 5.69 K/UL — SIGNIFICANT CHANGE UP (ref 1.8–7.4)
NEUTROPHILS NFR BLD AUTO: 69.8 % — SIGNIFICANT CHANGE UP (ref 43–77)
NRBC # BLD: 0 /100 WBCS — SIGNIFICANT CHANGE UP (ref 0–0)
PHOSPHATE SERPL-MCNC: 2.8 MG/DL — SIGNIFICANT CHANGE UP (ref 2.5–4.5)
PLATELET # BLD AUTO: 226 K/UL — SIGNIFICANT CHANGE UP (ref 150–400)
POTASSIUM SERPL-MCNC: 3.6 MMOL/L — SIGNIFICANT CHANGE UP (ref 3.5–5.3)
POTASSIUM SERPL-SCNC: 3.6 MMOL/L — SIGNIFICANT CHANGE UP (ref 3.5–5.3)
PROT SERPL-MCNC: 7.6 G/DL — SIGNIFICANT CHANGE UP (ref 6–8.3)
PROTHROM AB SERPL-ACNC: 20.5 SEC — HIGH (ref 10.5–13.4)
RBC # BLD: 4.65 M/UL — SIGNIFICANT CHANGE UP (ref 3.8–5.2)
RBC # FLD: 15.1 % — HIGH (ref 10.3–14.5)
SODIUM SERPL-SCNC: 141 MMOL/L — SIGNIFICANT CHANGE UP (ref 135–145)
WBC # BLD: 8.15 K/UL — SIGNIFICANT CHANGE UP (ref 3.8–10.5)
WBC # FLD AUTO: 8.15 K/UL — SIGNIFICANT CHANGE UP (ref 3.8–10.5)

## 2022-05-31 PROCEDURE — 99233 SBSQ HOSP IP/OBS HIGH 50: CPT | Mod: GC

## 2022-05-31 RX ORDER — BUDESONIDE AND FORMOTEROL FUMARATE DIHYDRATE 160; 4.5 UG/1; UG/1
2 AEROSOL RESPIRATORY (INHALATION)
Refills: 0 | Status: DISCONTINUED | OUTPATIENT
Start: 2022-05-31 | End: 2022-06-01

## 2022-05-31 RX ORDER — WARFARIN SODIUM 2.5 MG/1
12 TABLET ORAL ONCE
Refills: 0 | Status: COMPLETED | OUTPATIENT
Start: 2022-05-31 | End: 2022-05-31

## 2022-05-31 RX ADMIN — Medication 3 MILLILITER(S): at 18:22

## 2022-05-31 RX ADMIN — Medication 100 MILLIGRAM(S): at 06:07

## 2022-05-31 RX ADMIN — Medication 40 MILLIGRAM(S): at 06:06

## 2022-05-31 RX ADMIN — BUDESONIDE AND FORMOTEROL FUMARATE DIHYDRATE 2 PUFF(S): 160; 4.5 AEROSOL RESPIRATORY (INHALATION) at 18:26

## 2022-05-31 RX ADMIN — Medication 3 MILLILITER(S): at 12:02

## 2022-05-31 RX ADMIN — WARFARIN SODIUM 12 MILLIGRAM(S): 2.5 TABLET ORAL at 21:10

## 2022-05-31 RX ADMIN — Medication 600 MILLIGRAM(S): at 06:06

## 2022-05-31 RX ADMIN — AMLODIPINE BESYLATE 10 MILLIGRAM(S): 2.5 TABLET ORAL at 06:06

## 2022-05-31 RX ADMIN — Medication 3 MILLILITER(S): at 06:07

## 2022-05-31 RX ADMIN — Medication 100 MILLIGRAM(S): at 21:09

## 2022-05-31 RX ADMIN — Medication 600 MILLIGRAM(S): at 18:22

## 2022-05-31 RX ADMIN — LISINOPRIL 30 MILLIGRAM(S): 2.5 TABLET ORAL at 06:06

## 2022-05-31 RX ADMIN — Medication 100 MILLIGRAM(S): at 14:02

## 2022-05-31 NOTE — PROGRESS NOTE ADULT - NSPROGADDITIONALINFOA_GEN_ALL_CORE
56 female with H/O HTN, L subclavian DVT ( since 2007 on Warfarin), Asthma (no recent inhaler use since youth) p/w SOB, CP, Non-productive cough for the past 2-3 days which started suddenly with SOB and dry-cough with no inciting factor. She never has had an asthma exacerbation. She says she had 102 fever at home, and associated diarrhea with non bloody BM.  She has been taking warfarin since 2007, has never gotten w/u for the DVT. Does not smoke. Also endorses some R knee swelling, but no calf swelling.  ED: Dexax1, Duonebs and admitted for Acute Asthma exacerbation.     # Acute Asthma Exacerbation      Pt was placed on Prednisone oral , Duoneb and Symbicort and SPo2 has been stable ( SpO2: 96% (31 May, 2022 ) (93% - 96%)--> was reported as 93% post ambulation at  on 5/30      Will need to closely monitor patient for any worsening of symptoms or any improvement as she had pretty uncomplicated Asthma so far and unsure if there is another factor causing the current complaints     PRO BNP, COVID-19, RVP, procal, and CXR are all non contributory      Reported fever , diarrhea and Arthritis raising question of post viral which probably has resolved and not detected on RVP  # Obesity      Cont to monitor SPO2 and CO2 stable on VBG ( 5/30)      Weight loss      MIght need dietitian eval   # Mild Lactate elevation      Might be sec to Type Lactic A ( from Beta agonist )     Monitor as needed   # H/O Upper Ext DVT ( on Coumadin since 2007 as reported )      Pt is currently on Coumadin and INR is 1.76---> Will cont coumadin ( it was reported that she missed a dose on admission record) 56 female with H/O HTN, L subclavian DVT ( since 2007 on Warfarin), Asthma (no recent inhaler use since youth) p/w SOB, CP, Non-productive cough for the past 2-3 days which started suddenly with SOB and dry-cough with no inciting factor. She never has had an asthma exacerbation. She says she had 102 fever at home, and associated diarrhea with non bloody BM.  She has been taking warfarin since 2007, has never gotten w/u for the DVT. Does not smoke. Also endorses some R knee swelling, but no calf swelling.  ED: Dexax1, Duonebs and admitted for Acute Asthma exacerbation.  Patient states that her Asthma was very well controlled until one year ago after she had COVID-19 in 3/2021 and she started having dyspnea, post covid cough and was mostly at home    # Acute Asthma Exacerbation      Pt was placed on Prednisone oral , Duoneb and Symbicort and SPo2 has been stable ( SpO2: 96% (31 May, 2022 ) (93% - 96%)--> was reported as 93% post ambulation at  on 5/30      Will need to closely monitor patient for any worsening of symptoms or any improvement as she had pretty uncomplicated Asthma so far and unsure if there is another factor causing the current complaints     PRO BNP, COVID-19, RVP, procal, and CXR are all non contributory      Reported fever , diarrhea and Arthritis raising question of post viral which probably has resolved and not detected on RVP    ? Post COVID-19 related complaints   # Obesity      Cont to monitor SPO2 and CO2 stable on VBG ( 5/30)      Weight loss      MIght need dietitian eval   # Mild Lactate elevation      Might be sec to Type Lactic A ( from Beta agonist )     Monitor as needed   # H/O Upper Ext DVT ( on Coumadin since 2007 as reported )      Pt is currently on Coumadin and INR is 1.76---> Will cont coumadin ( it was reported that she missed a dose on admission record)    She will need further oupt evaluation

## 2022-05-31 NOTE — PROGRESS NOTE ADULT - SUBJECTIVE AND OBJECTIVE BOX
MD HAN Moralez [phone]/[facility] [id number]      PROGRESS NOTE:     Patient is a 56y old  Female who presents with a chief complaint of Asthma Exacerbation (30 May 2022 07:55)      SUBJECTIVE / OVERNIGHT EVENTS:    OVERNIGHT:       Patient examined at bedside        MEDICATIONS  (STANDING):  albuterol/ipratropium for Nebulization 3 milliLiter(s) Nebulizer every 6 hours  amLODIPine   Tablet 10 milliGRAM(s) Oral daily  benzonatate 100 milliGRAM(s) Oral every 8 hours  guaiFENesin  milliGRAM(s) Oral every 12 hours  lisinopril 30 milliGRAM(s) Oral daily  predniSONE   Tablet 40 milliGRAM(s) Oral daily    MEDICATIONS  (PRN):  acetaminophen     Tablet .. 650 milliGRAM(s) Oral every 6 hours PRN Moderate Pain (4 - 6)  melatonin 3 milliGRAM(s) Oral at bedtime PRN Insomnia      CAPILLARY BLOOD GLUCOSE        I&O's Summary    30 May 2022 07:01  -  31 May 2022 07:00  --------------------------------------------------------  IN: 920 mL / OUT: 0 mL / NET: 920 mL        PHYSICAL EXAM:  Vital Signs Last 24 Hrs  T(C): 36.7 (31 May 2022 05:29), Max: 37.1 (30 May 2022 13:10)  T(F): 98 (31 May 2022 05:29), Max: 98.7 (30 May 2022 13:10)  HR: 75 (31 May 2022 05:29) (75 - 94)  BP: 106/64 (31 May 2022 05:29) (106/64 - 135/82)  BP(mean): --  RR: 18 (31 May 2022 05:29) (18 - 19)  SpO2: 96% (31 May 2022 05:29) (93% - 96%)    CONSTITUTIONAL: NAD; well-developed  HEENT: PERRL, clear conjunctiva  RESPIRATORY: Normal respiratory effort; lungs are clear to auscultation bilaterally; No Crackles/Rhonchi/Wheezing  CARDIOVASCULAR: Regular rate and rhythm, normal S1 and S2, no murmur/rub/gallop; No lower extremity edema; Peripheral pulses are 2+ bilaterally  ABDOMEN: Nontender to palpation, normoactive bowel sounds, no rebound/guarding; No hepatosplenomegaly  MUSCULOSKELETAL: no clubbing or cyanosis of digits; no joint swelling or tenderness to palpation  EXTREMITY: Lower extremities Non-tender to palpation; non-erythematous B/L  NEURO: A&Ox3; no focal deficits   PSYCH: normal mood; Affect appropirate    LABS:                        12.5   8.15  )-----------( 226      ( 31 May 2022 07:22 )             39.9     05-31    141  |  106  |  17  ----------------------------<  108<H>  3.6   |  21<L>  |  0.79    Ca    9.5      31 May 2022 07:21  Phos  2.8     05-31  Mg     2.2     05-31    TPro  7.6  /  Alb  3.7  /  TBili  0.2  /  DBili  x   /  AST  21  /  ALT  20  /  AlkPhos  62  05-31    PT/INR - ( 31 May 2022 07:22 )   PT: 20.5 sec;   INR: 1.76 ratio         PTT - ( 31 May 2022 07:22 )  PTT:34.4 sec            RADIOLOGY & ADDITIONAL TESTS:  Results Reviewed:   Imaging Personally Reviewed:  Electrocardiogram Personally Reviewed:    COORDINATION OF CARE:  Care Discussed with Consultants/Other Providers [Y/N]:  Prior or Outpatient Records Reviewed [Y/N]:   MD HAN Moralez [phone]/[facility] [id number]      PROGRESS NOTE:     Patient is a 56y old  Female who presents with a chief complaint of Asthma Exacerbation (30 May 2022 07:55)      SUBJECTIVE / OVERNIGHT EVENTS:    OVERNIGHT:   NO reported overnight events     Patient examined at bedside        MEDICATIONS  (STANDING):  albuterol/ipratropium for Nebulization 3 milliLiter(s) Nebulizer every 6 hours  amLODIPine   Tablet 10 milliGRAM(s) Oral daily  benzonatate 100 milliGRAM(s) Oral every 8 hours  guaiFENesin  milliGRAM(s) Oral every 12 hours  lisinopril 30 milliGRAM(s) Oral daily  predniSONE   Tablet 40 milliGRAM(s) Oral daily    MEDICATIONS  (PRN):  acetaminophen     Tablet .. 650 milliGRAM(s) Oral every 6 hours PRN Moderate Pain (4 - 6)  melatonin 3 milliGRAM(s) Oral at bedtime PRN Insomnia      CAPILLARY BLOOD GLUCOSE        I&O's Summary    30 May 2022 07:01  -  31 May 2022 07:00  --------------------------------------------------------  IN: 920 mL / OUT: 0 mL / NET: 920 mL        PHYSICAL EXAM:  Vital Signs Last 24 Hrs  T(C): 36.7 (31 May 2022 05:29), Max: 37.1 (30 May 2022 13:10)  T(F): 98 (31 May 2022 05:29), Max: 98.7 (30 May 2022 13:10)  HR: 75 (31 May 2022 05:29) (75 - 94)  BP: 106/64 (31 May 2022 05:29) (106/64 - 135/82)  BP(mean): --  RR: 18 (31 May 2022 05:29) (18 - 19)  SpO2: 96% (31 May 2022 05:29) (93% - 96%)    CONSTITUTIONAL: NAD; well-developed  HEENT: PERRL, clear conjunctiva  RESPIRATORY: Normal respiratory effort; lungs are clear to auscultation bilaterally; No Crackles/Rhonchi/Wheezing  CARDIOVASCULAR: Regular rate and rhythm, normal S1 and S2, no murmur/rub/gallop; No lower extremity edema; Peripheral pulses are 2+ bilaterally  ABDOMEN: Nontender to palpation, normoactive bowel sounds, no rebound/guarding; No hepatosplenomegaly  MUSCULOSKELETAL: no clubbing or cyanosis of digits; no joint swelling or tenderness to palpation  EXTREMITY: Lower extremities Non-tender to palpation; non-erythematous B/L  NEURO: A&Ox3; no focal deficits   PSYCH: normal mood; Affect appropirate    LABS:                        12.5   8.15  )-----------( 226      ( 31 May 2022 07:22 )             39.9     05-31    141  |  106  |  17  ----------------------------<  108<H>  3.6   |  21<L>  |  0.79    Ca    9.5      31 May 2022 07:21  Phos  2.8     05-31  Mg     2.2     05-31    TPro  7.6  /  Alb  3.7  /  TBili  0.2  /  DBili  x   /  AST  21  /  ALT  20  /  AlkPhos  62  05-31    PT/INR - ( 31 May 2022 07:22 )   PT: 20.5 sec;   INR: 1.76 ratio         PTT - ( 31 May 2022 07:22 )  PTT:34.4 sec            RADIOLOGY & ADDITIONAL TESTS:  Results Reviewed:   Imaging Personally Reviewed:  Electrocardiogram Personally Reviewed:    COORDINATION OF CARE:  Care Discussed with Consultants/Other Providers [Y/N]:  Prior or Outpatient Records Reviewed [Y/N]:   Laz Washington MD  [phone]/[facility] [id number]      PROGRESS NOTE:     Patient is a 56y old  Female who presents with a chief complaint of Asthma Exacerbation (30 May 2022 07:55)      SUBJECTIVE / OVERNIGHT EVENTS:    OVERNIGHT:   NO reported overnight events     Patient examined at bedside with similar symptoms as day prior, not worst. Eating , having bowel movements. No otherROS         MEDICATIONS  (STANDING):  albuterol/ipratropium for Nebulization 3 milliLiter(s) Nebulizer every 6 hours  amLODIPine   Tablet 10 milliGRAM(s) Oral daily  benzonatate 100 milliGRAM(s) Oral every 8 hours  guaiFENesin  milliGRAM(s) Oral every 12 hours  lisinopril 30 milliGRAM(s) Oral daily  predniSONE   Tablet 40 milliGRAM(s) Oral daily    MEDICATIONS  (PRN):  acetaminophen     Tablet .. 650 milliGRAM(s) Oral every 6 hours PRN Moderate Pain (4 - 6)  melatonin 3 milliGRAM(s) Oral at bedtime PRN Insomnia      CAPILLARY BLOOD GLUCOSE        I&O's Summary    30 May 2022 07:01  -  31 May 2022 07:00  --------------------------------------------------------  IN: 920 mL / OUT: 0 mL / NET: 920 mL        PHYSICAL EXAM:  Vital Signs Last 24 Hrs  T(C): 36.7 (31 May 2022 05:29), Max: 37.1 (30 May 2022 13:10)  T(F): 98 (31 May 2022 05:29), Max: 98.7 (30 May 2022 13:10)  HR: 75 (31 May 2022 05:29) (75 - 94)  BP: 106/64 (31 May 2022 05:29) (106/64 - 135/82)  BP(mean): --  RR: 18 (31 May 2022 05:29) (18 - 19)  SpO2: 96% (31 May 2022 05:29) (93% - 96%)    CONSTITUTIONAL: NAD; well-developed; Obese ; Speaking full sentences with occasional dry cough when deep breath  HEENT: PERRL, clear conjunctiva  RESPIRATORY: Normal respiratory effort; + Wheezing R base;, No Crackles/Rhonchi  CARDIOVASCULAR: Regular rate and rhythm, normal S1 and S2, no murmur/rub/gallop; No lower extremity edema; Peripheral pulses are 2+ bilaterally  ABDOMEN: Nontender to Palpation; normoactive bowel sounds, no rebound/guarding; No hepatosplenomegaly  MUSCULOSKELETAL: no clubbing or cyanosis of digits; no joint swelling or tenderness to palpation  EXTREMITY:+ R Lower extremity knee joint TTP; no calf swelling, non-erythematous   NEURO: A&Ox3; no focal deficits   PSYCH: normal mood; Affect appropirate    LABS:                        12.5   8.15  )-----------( 226      ( 31 May 2022 07:22 )             39.9     05-31    141  |  106  |  17  ----------------------------<  108<H>  3.6   |  21<L>  |  0.79    Ca    9.5      31 May 2022 07:21  Phos  2.8     05-31  Mg     2.2     05-31    TPro  7.6  /  Alb  3.7  /  TBili  0.2  /  DBili  x   /  AST  21  /  ALT  20  /  AlkPhos  62  05-31    PT/INR - ( 31 May 2022 07:22 )   PT: 20.5 sec;   INR: 1.76 ratio         PTT - ( 31 May 2022 07:22 )  PTT:34.4 sec            RADIOLOGY & ADDITIONAL TESTS:  Results Reviewed:   Imaging Personally Reviewed:  Electrocardiogram Personally Reviewed:    COORDINATION OF CARE:  Care Discussed with Consultants/Other Providers [Y/N]:  Prior or Outpatient Records Reviewed [Y/N]:

## 2022-06-01 ENCOUNTER — TRANSCRIPTION ENCOUNTER (OUTPATIENT)
Age: 57
End: 2022-06-01

## 2022-06-01 VITALS
SYSTOLIC BLOOD PRESSURE: 110 MMHG | TEMPERATURE: 99 F | DIASTOLIC BLOOD PRESSURE: 69 MMHG | OXYGEN SATURATION: 96 % | HEART RATE: 81 BPM | RESPIRATION RATE: 17 BRPM

## 2022-06-01 LAB
ANION GAP SERPL CALC-SCNC: 11 MMOL/L — SIGNIFICANT CHANGE UP (ref 5–17)
APTT BLD: 35.9 SEC — HIGH (ref 27.5–35.5)
BUN SERPL-MCNC: 20 MG/DL — SIGNIFICANT CHANGE UP (ref 7–23)
CALCIUM SERPL-MCNC: 9.2 MG/DL — SIGNIFICANT CHANGE UP (ref 8.4–10.5)
CHLORIDE SERPL-SCNC: 106 MMOL/L — SIGNIFICANT CHANGE UP (ref 96–108)
CO2 SERPL-SCNC: 22 MMOL/L — SIGNIFICANT CHANGE UP (ref 22–31)
CREAT SERPL-MCNC: 0.74 MG/DL — SIGNIFICANT CHANGE UP (ref 0.5–1.3)
EGFR: 94 ML/MIN/1.73M2 — SIGNIFICANT CHANGE UP
GLUCOSE SERPL-MCNC: 81 MG/DL — SIGNIFICANT CHANGE UP (ref 70–99)
HCT VFR BLD CALC: 39 % — SIGNIFICANT CHANGE UP (ref 34.5–45)
HGB BLD-MCNC: 12.3 G/DL — SIGNIFICANT CHANGE UP (ref 11.5–15.5)
INR BLD: 1.24 RATIO — HIGH (ref 0.88–1.16)
MAGNESIUM SERPL-MCNC: 2.1 MG/DL — SIGNIFICANT CHANGE UP (ref 1.6–2.6)
MCHC RBC-ENTMCNC: 26.8 PG — LOW (ref 27–34)
MCHC RBC-ENTMCNC: 31.5 GM/DL — LOW (ref 32–36)
MCV RBC AUTO: 85 FL — SIGNIFICANT CHANGE UP (ref 80–100)
NRBC # BLD: 0 /100 WBCS — SIGNIFICANT CHANGE UP (ref 0–0)
PHOSPHATE SERPL-MCNC: 2.7 MG/DL — SIGNIFICANT CHANGE UP (ref 2.5–4.5)
PLATELET # BLD AUTO: 223 K/UL — SIGNIFICANT CHANGE UP (ref 150–400)
POTASSIUM SERPL-MCNC: 4 MMOL/L — SIGNIFICANT CHANGE UP (ref 3.5–5.3)
POTASSIUM SERPL-SCNC: 4 MMOL/L — SIGNIFICANT CHANGE UP (ref 3.5–5.3)
PROTHROM AB SERPL-ACNC: 14.4 SEC — HIGH (ref 10.5–13.4)
RBC # BLD: 4.59 M/UL — SIGNIFICANT CHANGE UP (ref 3.8–5.2)
RBC # FLD: 15.2 % — HIGH (ref 10.3–14.5)
SODIUM SERPL-SCNC: 139 MMOL/L — SIGNIFICANT CHANGE UP (ref 135–145)
WBC # BLD: 9.31 K/UL — SIGNIFICANT CHANGE UP (ref 3.8–10.5)
WBC # FLD AUTO: 9.31 K/UL — SIGNIFICANT CHANGE UP (ref 3.8–10.5)

## 2022-06-01 PROCEDURE — 84100 ASSAY OF PHOSPHORUS: CPT

## 2022-06-01 PROCEDURE — 94640 AIRWAY INHALATION TREATMENT: CPT

## 2022-06-01 PROCEDURE — 85730 THROMBOPLASTIN TIME PARTIAL: CPT

## 2022-06-01 PROCEDURE — 80053 COMPREHEN METABOLIC PANEL: CPT

## 2022-06-01 PROCEDURE — 85610 PROTHROMBIN TIME: CPT

## 2022-06-01 PROCEDURE — 82803 BLOOD GASES ANY COMBINATION: CPT

## 2022-06-01 PROCEDURE — 83735 ASSAY OF MAGNESIUM: CPT

## 2022-06-01 PROCEDURE — 86803 HEPATITIS C AB TEST: CPT

## 2022-06-01 PROCEDURE — 97161 PT EVAL LOW COMPLEX 20 MIN: CPT

## 2022-06-01 PROCEDURE — 93005 ELECTROCARDIOGRAM TRACING: CPT

## 2022-06-01 PROCEDURE — 36415 COLL VENOUS BLD VENIPUNCTURE: CPT

## 2022-06-01 PROCEDURE — 83880 ASSAY OF NATRIURETIC PEPTIDE: CPT

## 2022-06-01 PROCEDURE — 84484 ASSAY OF TROPONIN QUANT: CPT

## 2022-06-01 PROCEDURE — 83605 ASSAY OF LACTIC ACID: CPT

## 2022-06-01 PROCEDURE — 82947 ASSAY GLUCOSE BLOOD QUANT: CPT

## 2022-06-01 PROCEDURE — 84295 ASSAY OF SERUM SODIUM: CPT

## 2022-06-01 PROCEDURE — 0225U NFCT DS DNA&RNA 21 SARSCOV2: CPT

## 2022-06-01 PROCEDURE — 82435 ASSAY OF BLOOD CHLORIDE: CPT

## 2022-06-01 PROCEDURE — 84145 PROCALCITONIN (PCT): CPT

## 2022-06-01 PROCEDURE — 71046 X-RAY EXAM CHEST 2 VIEWS: CPT

## 2022-06-01 PROCEDURE — 85027 COMPLETE CBC AUTOMATED: CPT

## 2022-06-01 PROCEDURE — 84132 ASSAY OF SERUM POTASSIUM: CPT

## 2022-06-01 PROCEDURE — 99239 HOSP IP/OBS DSCHRG MGMT >30: CPT | Mod: GC

## 2022-06-01 PROCEDURE — 85025 COMPLETE CBC W/AUTO DIFF WBC: CPT

## 2022-06-01 PROCEDURE — 99285 EMERGENCY DEPT VISIT HI MDM: CPT | Mod: 25

## 2022-06-01 PROCEDURE — 80048 BASIC METABOLIC PNL TOTAL CA: CPT

## 2022-06-01 PROCEDURE — G0378: CPT

## 2022-06-01 PROCEDURE — 85014 HEMATOCRIT: CPT

## 2022-06-01 PROCEDURE — 82330 ASSAY OF CALCIUM: CPT

## 2022-06-01 PROCEDURE — 85018 HEMOGLOBIN: CPT

## 2022-06-01 PROCEDURE — 85379 FIBRIN DEGRADATION QUANT: CPT

## 2022-06-01 PROCEDURE — 87635 SARS-COV-2 COVID-19 AMP PRB: CPT

## 2022-06-01 RX ORDER — ALBUTEROL 90 UG/1
1 AEROSOL, METERED ORAL
Qty: 1 | Refills: 0
Start: 2022-06-01

## 2022-06-01 RX ORDER — BUDESONIDE AND FORMOTEROL FUMARATE DIHYDRATE 160; 4.5 UG/1; UG/1
2 AEROSOL RESPIRATORY (INHALATION)
Qty: 1 | Refills: 0
Start: 2022-06-01

## 2022-06-01 RX ORDER — AMLODIPINE BESYLATE 2.5 MG/1
1 TABLET ORAL
Qty: 0 | Refills: 0 | DISCHARGE

## 2022-06-01 RX ADMIN — Medication 3 MILLILITER(S): at 00:23

## 2022-06-01 RX ADMIN — Medication 3 MILLILITER(S): at 05:51

## 2022-06-01 RX ADMIN — Medication 600 MILLIGRAM(S): at 05:50

## 2022-06-01 RX ADMIN — BUDESONIDE AND FORMOTEROL FUMARATE DIHYDRATE 2 PUFF(S): 160; 4.5 AEROSOL RESPIRATORY (INHALATION) at 05:51

## 2022-06-01 RX ADMIN — LISINOPRIL 30 MILLIGRAM(S): 2.5 TABLET ORAL at 05:50

## 2022-06-01 RX ADMIN — Medication 100 MILLIGRAM(S): at 13:17

## 2022-06-01 RX ADMIN — AMLODIPINE BESYLATE 10 MILLIGRAM(S): 2.5 TABLET ORAL at 05:50

## 2022-06-01 RX ADMIN — Medication 100 MILLIGRAM(S): at 07:01

## 2022-06-01 RX ADMIN — Medication 40 MILLIGRAM(S): at 05:50

## 2022-06-01 NOTE — DISCHARGE NOTE PROVIDER - NSFOLLOWUPCLINICS_GEN_ALL_ED_FT
[facility]  Pulmonology  [address] [place] [address], [place] [address]  Phone: [phone]  Fax:   Follow Up Time: 1 week

## 2022-06-01 NOTE — DISCHARGE NOTE PROVIDER - NSDCCPTREATMENT_GEN_ALL_CORE_FT
PRINCIPAL PROCEDURE  Procedure: Chest xray, PA & lateral  Findings and Treatment: INTERPRETATION:  INDICATION: Shortness of breath and cough  COMPARISON: Chest x-ray 2/4/2014  FINDINGS:  Heart/Vascular: Heart size within normal limits. Left subclavian vascular   stent again noted.  Pulmonary: No focal consolidations. No pneumothorax or pleural effusions.  Bones: No acute osseous abnormalities.  Impression:  No focal consolidations.

## 2022-06-01 NOTE — PROGRESS NOTE ADULT - PROBLEM SELECTOR PLAN 3
Endorsed one episode of diarrhea nonbloody at home   No repeat event since yesterday   - cont. monitor , if does not improve/worsen will send out GI PCR
Endorsed one episode of diarrhea nonbloody at home   No repeat event since yesterday   - Resolved

## 2022-06-01 NOTE — PROGRESS NOTE ADULT - PROBLEM SELECTOR PLAN 6
DVT ppx: On Warfarin  Diet: Dash/TLC   Dispo: Home
DVT ppx: On Warfarin  Diet: Dash/TLC   Dispo: PT reccomended Home no PT needs

## 2022-06-01 NOTE — DISCHARGE NOTE PROVIDER - HOSPITAL COURSE
56 female, Hx: HTN, DVT (Warfarin), Asthma (no recent inhaler use since youth) p/w SOB, CP, Non-productive cough for the past 2-3 days. Patient says symptoms started suddenly with SOB and dry-cough with no inciting factor. She never has had an asthma exacerbation. She says she had 102 fever at home, and asociated diarrhea as well. Last bowel movement yesterday, Non-bloody. She has been taking warfarin since 2007, has never gotten w/u for the DVT. AS per records, had thrmbolysis in 2007 for L subclavian DVT. missed one dose of warfarin yesterday. Does not smoke. Also endorces some R knee swelling, but no calf swelling. Denies CP, Headache, back pain, N/V, ABD pain, constipation, urinary symptoms.     Hospital course   56 female, Hx: HTN, DVT (Warfarin), Asthma (no recent inhaler use since youth) p/w SOB, CP, Non-productive cough for the past 2-3 days. Patient says symptoms started suddenly with SOB and dry-cough with no inciting factor. She never has had an asthma exacerbation. She says she had 102 fever at home, and asociated diarrhea as well. Last bowel movement yesterday, Non-bloody. She has been taking warfarin since 2007, has never gotten w/u for the DVT. AS per records, had thrmbolysis in 2007 for L subclavian DVT. missed one dose of warfarin yesterday. Does not smoke. Also endorces some R knee swelling, but no calf swelling. Denies CP, Headache, back pain, N/V, ABD pain, constipation, urinary symptoms.     Hospital course  Initially SOB with wheezing found to be in Asthma Exacerbation. Given albuterl, symbicort and steroids with improvement with symptoms. Continued to take warfarin for her history of DVT. Symptomatic treatment for dry cough. On lisinopril and amlodipine (home meds) for treatment of hypertension. Medically stable to be discharged home w no PT needs and good outpatient follow-up PCP and pulmonary. 56 female, Hx: HTN, DVT (Warfarin), Asthma (no recent inhaler use since youth) p/w SOB, CP, Non-productive cough for the past 2-3 days. Patient says symptoms started suddenly with SOB and dry-cough with no inciting factor. She never has had an asthma exacerbation. She says she had 102 fever at home, and asociated diarrhea as well. Last bowel movement yesterday, Non-bloody. She has been taking warfarin since 2007, has never gotten w/u for the DVT. AS per records, had thrmbolysis in 2007 for L subclavian DVT. missed one dose of warfarin yesterday. Does not smoke. Also endorces some R knee swelling, but no calf swelling. Denies CP, Headache, back pain, N/V, ABD pain, constipation, urinary symptoms.     Hospital course  Initially SOB with wheezing found to be in Asthma Exacerbation. Given albuterl, symbicort and steroids with improvement with symptoms. Continued to take warfarin for her history of DVT. Symptomatic treatment for dry cough. On lisinopril and amlodipine (home meds) for treatment of hypertension. Medically stable to be discharged home w no PT needs and good outpatient follow-up PCP and pulmonary. Will follow-up with her PCP for INR check this friday 6/3 for 10am (HAS appointment).

## 2022-06-01 NOTE — DISCHARGE NOTE NURSING/CASE MANAGEMENT/SOCIAL WORK - NSDCPEFALRISK_GEN_ALL_CORE
For information on Fall & Injury Prevention, visit: [email or web address] OR  [email or web address] OR  https://www.cdc.gov/steadi/patient.html

## 2022-06-01 NOTE — PROGRESS NOTE ADULT - SUBJECTIVE AND OBJECTIVE BOX
Laz Washington MD  [phone]/[facility] [id number]      PROGRESS NOTE:     Patient is a 56y old  Female who presents with a chief complaint of Asthma Exacerbation (31 May 2022 08:22)      SUBJECTIVE / OVERNIGHT EVENTS:    OVERNIGHT:       Patient examined at bedside        MEDICATIONS  (STANDING):  albuterol/ipratropium for Nebulization 3 milliLiter(s) Nebulizer every 6 hours  amLODIPine   Tablet 10 milliGRAM(s) Oral daily  benzonatate 100 milliGRAM(s) Oral every 8 hours  budesonide  80 MICROgram(s)/formoterol 4.5 MICROgram(s) Inhaler 2 Puff(s) Inhalation two times a day  guaiFENesin  milliGRAM(s) Oral every 12 hours  lisinopril 30 milliGRAM(s) Oral daily  predniSONE   Tablet 40 milliGRAM(s) Oral daily    MEDICATIONS  (PRN):  acetaminophen     Tablet .. 650 milliGRAM(s) Oral every 6 hours PRN Moderate Pain (4 - 6)  melatonin 3 milliGRAM(s) Oral at bedtime PRN Insomnia      CAPILLARY BLOOD GLUCOSE        I&O's Summary    31 May 2022 07:01  -  01 Jun 2022 07:00  --------------------------------------------------------  IN: 1230 mL / OUT: 0 mL / NET: 1230 mL        PHYSICAL EXAM:  Vital Signs Last 24 Hrs  T(C): 36.7 (01 Jun 2022 05:28), Max: 37 (31 May 2022 17:48)  T(F): 98 (01 Jun 2022 05:28), Max: 98.6 (31 May 2022 17:48)  HR: 72 (01 Jun 2022 05:28) (72 - 111)  BP: 115/64 (01 Jun 2022 05:28) (94/58 - 122/78)  BP(mean): --  RR: 18 (01 Jun 2022 05:28) (18 - 18)  SpO2: 93% (01 Jun 2022 05:28) (92% - 95%)    CONSTITUTIONAL: NAD; well-developed  HEENT: PERRL, clear conjunctiva  RESPIRATORY: Normal respiratory effort; lungs are clear to auscultation bilaterally; No Crackles/Rhonchi/Wheezing  CARDIOVASCULAR: Regular rate and rhythm, normal S1 and S2, no murmur/rub/gallop; No lower extremity edema; Peripheral pulses are 2+ bilaterally  ABDOMEN: Nontender to palpation, normoactive bowel sounds, no rebound/guarding; No hepatosplenomegaly  MUSCULOSKELETAL: no clubbing or cyanosis of digits; no joint swelling or tenderness to palpation  EXTREMITY: Lower extremities Non-tender to palpation; non-erythematous B/L  NEURO: A&Ox3; no focal deficits   PSYCH: normal mood; Affect appropirate    LABS:                        12.3   9.31  )-----------( 223      ( 01 Jun 2022 07:22 )             39.0     05-31    141  |  106  |  17  ----------------------------<  108<H>  3.6   |  21<L>  |  0.79    Ca    9.5      31 May 2022 07:21  Phos  2.8     05-31  Mg     2.2     05-31    TPro  7.6  /  Alb  3.7  /  TBili  0.2  /  DBili  x   /  AST  21  /  ALT  20  /  AlkPhos  62  05-31    PT/INR - ( 31 May 2022 07:22 )   PT: 20.5 sec;   INR: 1.76 ratio         PTT - ( 31 May 2022 07:22 )  PTT:34.4 sec            RADIOLOGY & ADDITIONAL TESTS:  Results Reviewed:   Imaging Personally Reviewed:  Electrocardiogram Personally Reviewed:    COORDINATION OF CARE:  Care Discussed with Consultants/Other Providers [Y/N]:  Prior or Outpatient Records Reviewed [Y/N]:   Laz Washington MD  [phone]/[facility] [id number]      PROGRESS NOTE:     Patient is a 56y old  Female who presents with a chief complaint of Asthma Exacerbation (31 May 2022 08:22)      SUBJECTIVE / OVERNIGHT EVENTS:    OVERNIGHT:   - No overnight events     Patient examined at bedside with no acute complaints. She says she feels better today compared to when she first came in. Less wheezing. No significant ROS        MEDICATIONS  (STANDING):  albuterol/ipratropium for Nebulization 3 milliLiter(s) Nebulizer every 6 hours  amLODIPine   Tablet 10 milliGRAM(s) Oral daily  benzonatate 100 milliGRAM(s) Oral every 8 hours  budesonide  80 MICROgram(s)/formoterol 4.5 MICROgram(s) Inhaler 2 Puff(s) Inhalation two times a day  guaiFENesin  milliGRAM(s) Oral every 12 hours  lisinopril 30 milliGRAM(s) Oral daily  predniSONE   Tablet 40 milliGRAM(s) Oral daily    MEDICATIONS  (PRN):  acetaminophen     Tablet .. 650 milliGRAM(s) Oral every 6 hours PRN Moderate Pain (4 - 6)  melatonin 3 milliGRAM(s) Oral at bedtime PRN Insomnia      CAPILLARY BLOOD GLUCOSE        I&O's Summary    31 May 2022 07:01  -  01 Jun 2022 07:00  --------------------------------------------------------  IN: 1230 mL / OUT: 0 mL / NET: 1230 mL        PHYSICAL EXAM:  Vital Signs Last 24 Hrs  T(C): 36.7 (01 Jun 2022 05:28), Max: 37 (31 May 2022 17:48)  T(F): 98 (01 Jun 2022 05:28), Max: 98.6 (31 May 2022 17:48)  HR: 72 (01 Jun 2022 05:28) (72 - 111)  BP: 115/64 (01 Jun 2022 05:28) (94/58 - 122/78)  BP(mean): --  RR: 18 (01 Jun 2022 05:28) (18 - 18)  SpO2: 93% (01 Jun 2022 05:28) (92% - 95%)    CONSTITUTIONAL: NAD; well-developed; Obese ; Speaking full sentences with occasional dry cough when deep breath  HEENT: PERRL, clear conjunctiva  RESPIRATORY: Normal respiratory effort; + Wheezing R base;, No Crackles/Rhonchi  CARDIOVASCULAR: Regular rate and rhythm, normal S1 and S2, no murmur/rub/gallop; No lower extremity edema; Peripheral pulses are 2+ bilaterally  ABDOMEN: Nontender to Palpation; normoactive bowel sounds, no rebound/guarding; No hepatosplenomegaly  MUSCULOSKELETAL: no clubbing or cyanosis of digits; no joint swelling or tenderness to palpation  EXTREMITY:+ R Lower extremity knee joint TTP; no calf swelling, non-erythematous   NEURO: A&Ox3; no focal deficits   PSYCH: normal mood; Affect appropirate    LABS:                        12.3   9.31  )-----------( 223      ( 01 Jun 2022 07:22 )             39.0     05-31    141  |  106  |  17  ----------------------------<  108<H>  3.6   |  21<L>  |  0.79    Ca    9.5      31 May 2022 07:21  Phos  2.8     05-31  Mg     2.2     05-31    TPro  7.6  /  Alb  3.7  /  TBili  0.2  /  DBili  x   /  AST  21  /  ALT  20  /  AlkPhos  62  05-31    PT/INR - ( 31 May 2022 07:22 )   PT: 20.5 sec;   INR: 1.76 ratio         PTT - ( 31 May 2022 07:22 )  PTT:34.4 sec            RADIOLOGY & ADDITIONAL TESTS:  Results Reviewed:   Imaging Personally Reviewed:  Electrocardiogram Personally Reviewed:    COORDINATION OF CARE:  Care Discussed with Consultants/Other Providers [Y/N]:  Prior or Outpatient Records Reviewed [Y/N]:

## 2022-06-01 NOTE — PROGRESS NOTE ADULT - PROBLEM SELECTOR PLAN 2
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  Denies pleuretic CP, no calf swelling/erythema   INR subtherapeutic on admission, goal 2-3   Negative D dimer   - Cont. home dose of warfarin
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  Denies pleuretic CP, no calf swelling/erythema   INR subtherapeutic on admission, goal 2-3   Negative D dimer   - Cont. home dose of warfarin

## 2022-06-01 NOTE — PROGRESS NOTE ADULT - ASSESSMENT
56 female, Hx: HTN, DVT (Warfarin), Asthma (no recent inhaler use since youth) p/w SOB, CP, Non-productive cough for the past 2-3 days. Admitted to medicine for Asthma exacerbation. 
56 female, Hx: HTN, DVT (Warfarin), Asthma (no recent inhaler use since youth) p/w SOB, CP, Non-productive cough for the past 2-3 days. Admitted to medicine for Asthma exacerbation. Medically stable for discharge.

## 2022-06-01 NOTE — DISCHARGE NOTE NURSING/CASE MANAGEMENT/SOCIAL WORK - NSDCVIVACCINE_GEN_ALL_CORE_FT
Tdap; 01-Oct-2015 23:22; Viji Mars (PAMELA); [employer]; [id number]; IntraMuscular; Deltoid Left.; 0.5 milliLiter(s); VIS (VIS Published: 09-May-2013, VIS Presented: 01-Oct-2015);

## 2022-06-01 NOTE — PROGRESS NOTE ADULT - PROBLEM SELECTOR PLAN 1
Endorsed SOB, wheezing and dry cough at home. No recent sick contacts   Did endorse 102F at home , w associated diarrhea , possible exacerbation 2/2 Viral URI.  No focal consolidation on CXR , low concern for bacterial PNA   S/P Duonebs and dexa in ED which improved her symptoms greatly    - Standing duonebs q6   - C/w prednisone 40mg x 4 days (2 days left)  - Symptomatic Tx for Cough  - Symbycort q8
Endorsed SOB, wheezing and dry cough at home. No recent sick contacts   Did endorse 102F at home , w associated diarrhea , possible exacerbation 2/2 Viral URI.  No focal consolidation on CXR , low concern for bacterial PNA   S/P Duonebs and dexa in ED which improved her symptoms greatly    - Standing duonebs q6   - C/w prednisone 40mg x 4 days   - F/U RVP   - Symptomatic Tx for Cough

## 2022-06-01 NOTE — DISCHARGE NOTE PROVIDER - NSDCCPCAREPLAN_GEN_ALL_CORE_FT
PRINCIPAL DISCHARGE DIAGNOSIS  Diagnosis: Acute asthma exacerbation  Assessment and Plan of Treatment: Asthma attack, also called acute bronchospasm, is the sudden narrowing and tightening of the air passages, which limits the amount of oxygen that can get into the lungs. The narrowing is caused by inflammation and tightening of the muscles in the air tubes (bronchi) in the lungs.  Too much mucus is also produced, which narrows the airways more. This can cause trouble breathing, loud breathing (wheezing), and coughing. The goal of treatment is to open the airways in the lungs and reduce inflammation.  Your shortness of breath may have been related to an Asthma exacerbation. We gave you multiple medications in the hospital that helped control the weezing and shortness of breath that you were experiecing. Your symptoms improved greatly while you were on those medications. Your imaging were normal, and bloodwork did not indicate an infection. We are sending you prescriptions for inhalers that you will need to take at home. Please make sure to follow-up with your primary care doctor 1-2 weeks soon after you are discharged from the hospital. We will include pulmonary clinic information as well for you to follow up with.   Please continue to take your home medications of Amlodipine, lisinopril, Warfarin  Please take Albuterol and Symbicort at home  Please take Prednisone for 2 more days.         SECONDARY DISCHARGE DIAGNOSES  Diagnosis: Deep vein thrombosis (DVT)  Assessment and Plan of Treatment: You have been taking Warfarin for a long time for your history of Blood Clot. Please continue to take your Warfarin medication at home and have proper follow up to get your INR checked with your primary care doctor.   Please follow up with your Primary care doctor within 1-2 weeks after being discharged.     PRINCIPAL DISCHARGE DIAGNOSIS  Diagnosis: Acute asthma exacerbation  Assessment and Plan of Treatment: Asthma attack, also called acute bronchospasm, is the sudden narrowing and tightening of the air passages, which limits the amount of oxygen that can get into the lungs. The narrowing is caused by inflammation and tightening of the muscles in the air tubes (bronchi) in the lungs.  Too much mucus is also produced, which narrows the airways more. This can cause trouble breathing, loud breathing (wheezing), and coughing. The goal of treatment is to open the airways in the lungs and reduce inflammation.  Your shortness of breath may have been related to an Asthma exacerbation. We gave you multiple medications in the hospital that helped control the weezing and shortness of breath that you were experiecing. Your symptoms improved greatly while you were on those medications. Your imaging were normal, and bloodwork did not indicate an infection. We are sending you prescriptions for inhalers that you will need to take at home. Please make sure to follow-up with your primary care doctor early next week after you are discharged from the hospital. We will include pulmonary clinic information as well for you to follow up with.   Please continue to take your home medications of Amlodipine, lisinopril, Warfarin  Please take Albuterol and Symbicort at home  Please take Prednisone for 2 more days.         SECONDARY DISCHARGE DIAGNOSES  Diagnosis: Deep vein thrombosis (DVT)  Assessment and Plan of Treatment: You have been taking Warfarin for a long time for your history of Blood Clot. Please continue to take your Warfarin medication at home and have proper follow up to get your INR checked with your primary care doctor.   Please follow up with your Primary care doctor early next week so that you can get your INR checked.     PRINCIPAL DISCHARGE DIAGNOSIS  Diagnosis: Acute asthma exacerbation  Assessment and Plan of Treatment: Asthma attack, also called acute bronchospasm, is the sudden narrowing and tightening of the air passages, which limits the amount of oxygen that can get into the lungs. The narrowing is caused by inflammation and tightening of the muscles in the air tubes (bronchi) in the lungs.  Too much mucus is also produced, which narrows the airways more. This can cause trouble breathing, loud breathing (wheezing), and coughing. The goal of treatment is to open the airways in the lungs and reduce inflammation.  Your shortness of breath may have been related to an Asthma exacerbation. We gave you multiple medications in the hospital that helped control the weezing and shortness of breath that you were experiecing. Your symptoms improved greatly while you were on those medications. Your imaging were normal, and bloodwork did not indicate an infection. We are sending you prescriptions for inhalers that you will need to take at home. Please make sure to follow-up with your primary care doctor ( YOU HAVE AN APPOINTMENT WITH DR. HARRINGTON THIS FRIDAY 6/3 at 10am, for INR Check). We will include pulmonary clinic information as well for you to follow up with.   Please continue to take your home medications of Amlodipine, lisinopril, Warfarin  Please take Albuterol and Symbicort at home  Please take Prednisone for 2 more days.         SECONDARY DISCHARGE DIAGNOSES  Diagnosis: Deep vein thrombosis (DVT)  Assessment and Plan of Treatment: You have been taking Warfarin for a long time for your history of Blood Clot. Please continue to take your Warfarin medication at home and have proper follow up to get your INR checked with your primary care doctor.   Please follow up with your Primary care doctor , you have an appointment this Friday 6/3 for 10am with Dr. Harrington for an INR check.

## 2022-06-01 NOTE — DISCHARGE NOTE NURSING/CASE MANAGEMENT/SOCIAL WORK - NSDCFUADDAPPT_GEN_ALL_CORE_FT
Please follow up with your primary-care doctor, Dr. Real.   You have an appointment this Friday 6/3 at 10am for an INR check.     Please follow-up with Pulmonolgy (lung doctors) within 1-2 weeks after being discharged for further evaluation of your asthma exacerbation. Address and phone number provided  [facility] Pulmonolgy and Sleep Medicine   Pulmonology   [address] [place] [address], [place] [address]  Phone: [phone]

## 2022-06-01 NOTE — PROGRESS NOTE ADULT - PROBLEM SELECTOR PLAN 4
Takes Amlodipine 10, Lisinopril 30 at home   - Restart home meds   - C/w Amlodipine 10mg   - C/w Lisinopril 30mg
Takes Amlodipine 10, Lisinopril 30 at home   - Restart home meds   - C/w Amlodipine 10mg   - C/w Lisinopril 30mg

## 2022-06-01 NOTE — DISCHARGE NOTE NURSING/CASE MANAGEMENT/SOCIAL WORK - PATIENT PORTAL LINK FT
You can access the FollowMyHealth Patient Portal offered by [facility] by registering at the following website: [email or web address]. By joining [facility]’s FollowMyHealth portal, you will also be able to view your health information using other applications (apps) compatible with our system.

## 2022-06-01 NOTE — DISCHARGE NOTE PROVIDER - NSDCFUADDAPPT_GEN_ALL_CORE_FT
Please follow up with your  Please follow up with your primary  Please follow up with your primary-care doctor within 1 week after being discharged     Please follow-up with Pulmonolgy (lung doctors) within 1-2 weeks after being discharged for further evaluation of your asthma exacerbation. Address and phone number provided  [facility] Pulmonolgy and Sleep Medicine   Pulmonology   [address] [place] [address]  Phone: [phone]   Please follow up with your primary-care doctor, Dr. Real.   You have an appointment this Friday 6/3 at 10am for an INR check.     Please follow-up with Pulmonolgy (lung doctors) within 1-2 weeks after being discharged for further evaluation of your asthma exacerbation. Address and phone number provided  [facility] Pulmonolgy and Sleep Medicine   Pulmonology   [address] [place] [address], [place] [address]  Phone: [phone]

## 2022-06-01 NOTE — DISCHARGE NOTE PROVIDER - CARE PROVIDER_API CALL
Nurse, Jess WOODS; MOSES; FACP)  Internal Medicine  [address] [place] [address]  Phone: [phone]  Fax: [phone]  Follow Up Time: 1 week

## 2022-06-01 NOTE — PROGRESS NOTE ADULT - ATTENDING COMMENTS
56 female with H/O HTN, L subclavian DVT ( since 2007 on Warfarin), Asthma (no recent inhaler use since youth) p/w SOB, CP, Non-productive cough for the past 2-3 days which started suddenly with SOB and dry-cough with no inciting factor. She never has had an asthma exacerbation. She says she had 102 fever at home, and associated diarrhea with non bloody BM.  She has been taking warfarin since 2007, has never gotten w/u for the DVT. Does not smoke. Also endorses some R knee swelling, but no calf swelling.  ED: Dexax1, Duonebs and admitted for Acute Asthma exacerbation.  Patient states that her Asthma was very well controlled until one year ago after she had COVID-19 in 3/2021 and she started having dyspnea, post covid cough and was mostly at home    # Acute Asthma Exacerbation      Pt was placed on Prednisone oral , Duoneb and Symbicort and SPo2 has been stable ( SpO2: 96% (31 May, 2022 ) (93% - 96%)--> was reported as 93% post ambulation at  on 5/30      Will need to closely monitor patient for any worsening of symptoms or any improvement as she had pretty uncomplicated Asthma so far and his symptoms worsened post covid -19      PRO BNP, COVID-19, RVP, procal, and CXR are all non contributory      Reported fever , diarrhea and Arthritis raising question of post viral which probably has resolved and not detected on RVP     Most likley Post COVID-19 related complaints , will need PCP monitoring and will need an outpt Pulmonary visit and follow up   # Obesity      Cont to monitor SPO2 and CO2 stable on VBG ( 5/30)      Weight loss      MIght need dietitian eval   # Mild Lactate elevation      Might be sec to Type Lactic A ( from Beta agonist )     Monitor as needed   # H/O Upper Ext DVT ( on Coumadin since 2007 as reported )      Pt is currently on Coumadin and INR is subtherapeutic --> Will cont coumadin ( it was reported that she missed a dose on admission record)    She will need further oupt evaluation---> repeat INR In 2 days and dose coumadin accordingly / no high vit K containing food.      DC time 45mns   Flores Haro   HOspitalist
56 female with H/O HTN, L subclavian DVT ( since 2007 on Warfarin), Asthma (no recent inhaler use since youth) p/w SOB, CP, Non-productive cough for the past 2-3 days which started suddenly with SOB and dry-cough with no inciting factor. She never has had an asthma exacerbation. She says she had 102 fever at home, and associated diarrhea with non bloody BM.  She has been taking warfarin since 2007, has never gotten w/u for the DVT. Does not smoke. Also endorses some R knee swelling, but no calf swelling.  ED: Dexax1, Duonebs and admitted for Acute Asthma exacerbation.  Patient states that her Asthma was very well controlled until one year ago after she had COVID-19 in 3/2021 and she started having dyspnea, post covid cough and was mostly at home    # Acute Asthma Exacerbation      Pt was placed on Prednisone oral , Duoneb and Symbicort and SPo2 has been stable ( SpO2: 96% (31 May, 2022 ) (93% - 96%)--> was reported as 93% post ambulation at  on 5/30      Will need to closely monitor patient for any worsening of symptoms or any improvement as she had pretty uncomplicated Asthma so far and unsure if there is another factor causing the current complaints     PRO BNP, COVID-19, RVP, procal, and CXR are all non contributory      Reported fever , diarrhea and Arthritis raising question of post viral which probably has resolved and not detected on RVP    ? Post COVID-19 related complaints   # Obesity      Cont to monitor SPO2 and CO2 stable on VBG ( 5/30)      Weight loss      MIght need dietitian eval   # Mild Lactate elevation      Might be sec to Type Lactic A ( from Beta agonist )     Monitor as needed   # H/O Upper Ext DVT ( on Coumadin since 2007 as reported )      Pt is currently on Coumadin and INR is 1.76---> Will cont coumadin ( it was reported that she missed a dose on admission record)    She will need further oupt evaluation

## 2022-06-01 NOTE — DISCHARGE NOTE PROVIDER - NSDCMRMEDTOKEN_GEN_ALL_CORE_FT
AMLODIPINE BESYLATE 10 MG TAB: TAKE 1 TABLET BY MOUTH EVERY DAY  LISINOPRIL 30MG TAB: 30 tab(s) orally once a day  warfarin: 10 milligram(s) orally once a day    Takes 7mg on Mondays   Takes 12mg Tues - Sun   AMLODIPINE BESYLATE 10 MG TAB: TAKE 1 TABLET BY MOUTH EVERY DAY  benzonatate 100 mg oral capsule: 1 cap(s) orally every 8 hours, As Needed   budesonide-formoterol 80 mcg-4.5 mcg/inh inhalation aerosol: 2 puff(s) inhaled 2 times a day     #Test script [id number]  LISINOPRIL 30MG TAB: 30 tab(s) orally once a day  predniSONE 20 mg oral tablet: 1 tab(s) orally once a day   ProAir HFA 90 mcg/inh inhalation aerosol: 1 puff(s) inhaled 2 times a day, As Needed       #Test script [id number]  warfarin: 10 milligram(s) orally once a day    Takes 7mg on Mondays   Takes 12mg Tues - Sun   AMLODIPINE BESYLATE 10 MG TAB: 1 milligram(s) orally once a day  benzonatate 100 mg oral capsule: 1 cap(s) orally every 8 hours, As Needed   budesonide-formoterol 80 mcg-4.5 mcg/inh inhalation aerosol: 2 puff(s) inhaled 2 times a day     #Test script [id number]  LISINOPRIL 30MG TAB: 30 tab(s) orally once a day  predniSONE 20 mg oral tablet: 1 tab(s) orally once a day   ProAir HFA 90 mcg/inh inhalation aerosol: 1 puff(s) inhaled 2 times a day, As Needed       #Test script [id number]  warfarin: 10 milligram(s) orally once a day    Takes 7mg on Mondays   Takes 12mg Tues - Sun

## 2022-07-05 ENCOUNTER — APPOINTMENT (OUTPATIENT)
Dept: PULMONOLOGY | Facility: CLINIC | Age: 57
End: 2022-07-05

## 2022-07-05 VITALS
WEIGHT: 257 LBS | BODY MASS INDEX: 42.82 KG/M2 | HEIGHT: 65 IN | TEMPERATURE: 97.8 F | HEART RATE: 84 BPM | DIASTOLIC BLOOD PRESSURE: 72 MMHG | OXYGEN SATURATION: 98 % | SYSTOLIC BLOOD PRESSURE: 120 MMHG

## 2022-07-05 DIAGNOSIS — J45.909 UNSPECIFIED ASTHMA, UNCOMPLICATED: ICD-10-CM

## 2022-07-05 PROCEDURE — 99215 OFFICE O/P EST HI 40 MIN: CPT

## 2022-07-05 NOTE — HISTORY OF PRESENT ILLNESS
[TextBox_4] : 56 year old patient presents for evaluation of asthma. \par \par She has remote  history of asthma.  She was hospitalized in May 2022.  She had wheeze and cough. Treated with Symbicort and albuterol which she is using with benefit.\par \par \par She has  history of DVT and also had RIJ and subclavian thrombosis, remains on warfarin.  US demonstrated chronic nonocclusive clot\par \par She had COVID in 2021, with severe cough. She states she has been worse since this.  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  \par \par \par Primary doctor is Dr Mercado Nurse\par \par \par PSH:\par \par stent left arm\par \par \par PMH:\par \par HTN, on lisinopril, warfarin, amlodipine \par \par dvt, 2007, neck\par \par no LE dvt\par \par \par \par SH:\par \par former smoker, quit 1991, smoked a few years\par \par \par ETOH:  occasional\par \par \par Occupation: \par \par Has exposure to  dust, unknown\par \par wears mask\par \par \par \par \par ALLERGY:\par \par NKDA\par \par \par environmental/seasonal allergy: none\par \par \par \par Review of Systems:\par \par \par no sinusitis, sinus infections, nasal obstruction\par no dysphagia\par no dry mouth\par \par no arthritis\par no joint aches\par no joint swelling\par \par \par no pneumonia\par \par no lung cancer\par \par no CAD\par no MI\par no chest pain\par no murmur\par no CHF\par \par no edema\par \par no peptic ulcer or gastritis\par no GERD\par no abdominal pain\par \par \par no Diabetes\par no thyroid disease\par no hyperlipidemia\par \par \par no bleeding\par no kidney disease\par \par no stroke\par no seizure\par \par \par \par \par \par \par \par \par \par \par \par   [Snoring] : no snoring [Unintentional Sleep while Active] : no unintentional sleep while active [Witnessed Apneas] : no witnessed apneas

## 2022-07-05 NOTE — PHYSICAL EXAM
[No Acute Distress] : no acute distress [Enlarged Base of the Tongue] : enlarged base of the tongue [II] : Mallampati Class: II [Normal Appearance] : normal appearance [Normal Rate/Rhythm] : normal rate/rhythm [Normal S1, S2] : normal s1, s2 [No Murmurs] : no murmurs [No Resp Distress] : no resp distress [Clear to Auscultation Bilaterally] : clear to auscultation bilaterally [No Abnormalities] : no abnormalities [Benign] : benign [Not Tender] : not tender [No HSM] : no hsm [No Hernias] : no hernias [No Clubbing] : no clubbing [No Edema] : no edema [No Focal Deficits] : no focal deficits [Oriented x3] : oriented x3 [Normal Affect] : normal affect [TextBox_[id number]] : diminished aeration

## 2022-07-05 NOTE — DISCUSSION/SUMMARY
[FreeTextEntry1] : obstructive airways disease improved on Symbicort 160/4.5 2 puffs twice per day.\par \par albuterol MDI as needed \par \par hx upper extremity and IJ DVT\par \par \par PLAN\par \par PFT\par \par recommend echocardiogram to rule out pulmonary HTN\par \par continue Symbicort 160/4.5 2 puffs twice per day.\par \par albuterol MDI as needed \par \par Further recommendations based on results. \par \par Melvin Mc MD

## 2022-07-12 ENCOUNTER — APPOINTMENT (OUTPATIENT)
Dept: PULMONOLOGY | Facility: CLINIC | Age: 57
End: 2022-07-12

## 2023-05-19 ENCOUNTER — APPOINTMENT (OUTPATIENT)
Dept: UROLOGY | Facility: CLINIC | Age: 58
End: 2023-05-19
Payer: COMMERCIAL

## 2023-05-19 VITALS
WEIGHT: 244 LBS | OXYGEN SATURATION: 96 % | HEIGHT: 65 IN | SYSTOLIC BLOOD PRESSURE: 126 MMHG | HEART RATE: 77 BPM | DIASTOLIC BLOOD PRESSURE: 82 MMHG | TEMPERATURE: 98 F | BODY MASS INDEX: 40.65 KG/M2

## 2023-05-19 DIAGNOSIS — Z87.448 PERSONAL HISTORY OF OTHER DISEASES OF URINARY SYSTEM: ICD-10-CM

## 2023-05-19 PROCEDURE — 99204 OFFICE O/P NEW MOD 45 MIN: CPT

## 2023-05-19 RX ORDER — AMLODIPINE BESYLATE 10 MG/1
10 TABLET ORAL
Refills: 0 | Status: ACTIVE | COMMUNITY

## 2023-05-19 RX ORDER — LISINOPRIL 30 MG/1
30 TABLET ORAL
Refills: 0 | Status: ACTIVE | COMMUNITY

## 2023-05-19 RX ORDER — FUROSEMIDE 40 MG/1
40 TABLET ORAL
Refills: 0 | Status: ACTIVE | COMMUNITY

## 2023-05-19 RX ORDER — WARFARIN 4 MG/1
TABLET ORAL
Refills: 0 | Status: ACTIVE | COMMUNITY

## 2023-05-19 NOTE — ASSESSMENT
[FreeTextEntry1] : We discussed hematuria today and the multiple potential reasons for its presence. I discussed both benign and malignant etiologies that may explain hematuria. I've also reviewed the workup it is generally recommended for evaluation of  hematuria with the purposes of ruling out malignancy or other urinary tract pathology that could warrant intervention.\par I've explained that cystoscopy would be recommended and the reasons for it as well as the risks of bleeding infection and damage to urethra\par A catscan with and without contrast was ordered and discussed with patient\par Pt will follow up to review the at scan and for the cystoscopy\par

## 2023-05-19 NOTE — HISTORY OF PRESENT ILLNESS
[FreeTextEntry1] : cc gross hematuria \par 89 yo fem referred for eval gross hematuria last 3 weks \par no h/o infections \par no dysuria \par quit smoking 91 smoked a few year \par no fam h/o gu ca \par \par  \par IMPRESSION:  \par 1. Possible small stone lower pole right kidney with no obstruction.\par 2. Simple left renal cyst. No additional imaging is recommended.\par 3. Enlarged echogenic liver with poor penetration consistent with hepatocellular disease such as steatosis\par  \par  \par

## 2023-05-22 LAB — BACTERIA UR CULT: NORMAL

## 2023-06-07 LAB — URINE CYTOLOGY: NORMAL

## 2023-06-21 ENCOUNTER — APPOINTMENT (OUTPATIENT)
Dept: UROLOGY | Facility: CLINIC | Age: 58
End: 2023-06-21
Payer: COMMERCIAL

## 2023-06-21 VITALS
BODY MASS INDEX: 40.65 KG/M2 | HEART RATE: 84 BPM | OXYGEN SATURATION: 96 % | HEIGHT: 65 IN | SYSTOLIC BLOOD PRESSURE: 124 MMHG | WEIGHT: 244 LBS | DIASTOLIC BLOOD PRESSURE: 60 MMHG | TEMPERATURE: 98 F

## 2023-06-21 DIAGNOSIS — R31.0 GROSS HEMATURIA: ICD-10-CM

## 2023-06-21 PROCEDURE — 52000 CYSTOURETHROSCOPY: CPT

## 2023-06-27 PROBLEM — R31.0 GROSS HEMATURIA: Status: ACTIVE | Noted: 2023-05-19

## 2023-07-23 NOTE — PHYSICAL THERAPY INITIAL EVALUATION ADULT - PASSIVE RANGE OF MOTION EXAMINATION, REHAB EVAL
75-year-old woman was admitted with acute CHF.  She diuresed well with IV Lasix.  She was followed by Cardiology.  Her symptoms improved and she was discharged home in stable condition on her previous oral diuretic regimen.  She will follow up with Cardiology.   no Passive ROM deficits were identified

## 2023-09-21 ENCOUNTER — APPOINTMENT (OUTPATIENT)
Dept: UROLOGY | Facility: CLINIC | Age: 58
End: 2023-09-21

## 2024-03-19 NOTE — PATIENT PROFILE ADULT - HAS THE PATIENT RECEIVED THE INFLUENZA VACCINE THIS SEASON?
Walk and exercise  Physiotherapy for right side  Fall precautions  In case of any question, plz contact through [facility]     yes...